# Patient Record
Sex: FEMALE | Race: WHITE | NOT HISPANIC OR LATINO | Employment: FULL TIME | URBAN - METROPOLITAN AREA
[De-identification: names, ages, dates, MRNs, and addresses within clinical notes are randomized per-mention and may not be internally consistent; named-entity substitution may affect disease eponyms.]

---

## 2017-12-20 ENCOUNTER — HOSPITAL ENCOUNTER (OUTPATIENT)
Dept: RADIOLOGY | Facility: HOSPITAL | Age: 42
Discharge: HOME | End: 2017-12-20
Attending: SURGERY
Payer: COMMERCIAL

## 2017-12-20 ENCOUNTER — OFFICE VISIT (OUTPATIENT)
Dept: SURGERY | Facility: CLINIC | Age: 42
End: 2017-12-20
Payer: COMMERCIAL

## 2017-12-20 VITALS
SYSTOLIC BLOOD PRESSURE: 144 MMHG | HEIGHT: 64 IN | WEIGHT: 201.94 LBS | TEMPERATURE: 98 F | HEART RATE: 80 BPM | BODY MASS INDEX: 34.48 KG/M2 | DIASTOLIC BLOOD PRESSURE: 89 MMHG

## 2017-12-20 DIAGNOSIS — R22.1 SUBCUTANEOUS MASS OF NECK: ICD-10-CM

## 2017-12-20 DIAGNOSIS — R22.1 SUBCUTANEOUS MASS OF NECK: Primary | ICD-10-CM

## 2017-12-20 PROCEDURE — 99999 PR PBB SHADOW E&M-EST. PATIENT-LVL III: CPT | Mod: PBBFAC,,, | Performed by: SURGERY

## 2017-12-20 PROCEDURE — 76536 US EXAM OF HEAD AND NECK: CPT | Mod: TC,PO

## 2017-12-20 PROCEDURE — 99204 OFFICE O/P NEW MOD 45 MIN: CPT | Mod: S$GLB,,, | Performed by: SURGERY

## 2017-12-20 RX ORDER — CELECOXIB 200 MG/1
200 CAPSULE ORAL DAILY
COMMUNITY
End: 2021-05-17

## 2017-12-20 RX ORDER — AMLODIPINE BESYLATE 10 MG/1
10 TABLET ORAL DAILY
COMMUNITY
End: 2020-07-06

## 2017-12-20 NOTE — PROGRESS NOTES
"History & Physical    SUBJECTIVE:     History of Present Illness:  Patient is a 42 y.o. female with HTN presents with posterior neck cyst. She first noticed it 1.5 years ago.  A few weeks ago, the mass started causing her discomfort with certain positions.  It has fluctuated in size since she first noticed it. Patient denies drainage, redness, fever, chills.  She has never been placed on antibiotics for this. Symptoms are aggravated by positioning. Symptoms improve with time. She reports occasional headaches and nausea associated with increases in the pain at that site (mostly if she abruptly turns her head).  She is on no anticoagulation.     Chief Complaint   Patient presents with    Consult       Review of patient's allergies indicates:  No Known Allergies    Current Outpatient Prescriptions   Medication Sig Dispense Refill    amLODIPine (NORVASC) 10 MG tablet Take 10 mg by mouth once daily.      celecoxib (CELEBREX) 200 MG capsule Take 200 mg by mouth 2 (two) times daily.       No current facility-administered medications for this visit.        No past medical history on file.     No past surgical history on file.     No family history on file.     Social History   Substance Use Topics    Smoking status: Never Smoker    Smokeless tobacco: Never Used    Alcohol use Not on file        Review of Systems:  Review of Systems   Constitutional: Negative for chills, fever and unexpected weight change.   HENT: Negative.    Respiratory: Negative.    Cardiovascular: Negative.    Gastrointestinal: Positive for nausea.   Genitourinary: Negative.    Musculoskeletal: Positive for neck pain.   Neurological: Positive for headaches.   Hematological: Does not bruise/bleed easily.       OBJECTIVE:     Vital Signs (Most Recent)  Temp: 98.4 °F (36.9 °C) (12/20/17 1349)  Pulse: 80 (12/20/17 1349)  BP: (!) 144/89 (12/20/17 1349)  5' 4" (1.626 m)  91.6 kg (201 lb 15.1 oz)     Physical Exam:  Physical Exam   Constitutional: She " appears well-developed and well-nourished. No distress.   HENT:   Head: Normocephalic and atraumatic.   Eyes: Conjunctivae and EOM are normal. No scleral icterus.   Neck: Normal range of motion.   Subcutaneous mass in the posterior midline neck near her hair line; mass is not affiliated with the skin; it is non-tender; no erythema or fluctuance; it is semi-soft; it feels deep and not easily mobile   Cardiovascular: Normal rate and regular rhythm.    Pulmonary/Chest: Effort normal. No respiratory distress.   Skin: She is not diaphoretic.   Nursing note and vitals reviewed.      Laboratory  n/a    Diagnostic Results:  n/a    ASSESSMENT/PLAN:     Sofie Muniz is a 42 y.o. female with HTN and posterior neck subcutaneous mass    PLAN:Plan     -Prior to any operative intervention, will obtain US to better characterize mass in the posterior midline neck  -RTC after to discuss results +/- remove the mass in clinic    KATHY Garner MD  PGY-4  Pager: [phone]

## 2017-12-22 ENCOUNTER — OFFICE VISIT (OUTPATIENT)
Dept: SURGERY | Facility: CLINIC | Age: 42
End: 2017-12-22
Payer: COMMERCIAL

## 2017-12-22 VITALS
DIASTOLIC BLOOD PRESSURE: 88 MMHG | BODY MASS INDEX: 34.03 KG/M2 | WEIGHT: 199.31 LBS | HEIGHT: 64 IN | SYSTOLIC BLOOD PRESSURE: 138 MMHG | TEMPERATURE: 98 F | HEART RATE: 79 BPM

## 2017-12-22 DIAGNOSIS — R22.1 NECK MASS: Primary | ICD-10-CM

## 2017-12-22 PROCEDURE — 20100 EXPL PENTRG WOUND NECK: CPT | Mod: ,,, | Performed by: SURGERY

## 2017-12-22 PROCEDURE — 99999 PR PBB SHADOW E&M-EST. PATIENT-LVL III: CPT | Mod: PBBFAC,,, | Performed by: SURGERY

## 2017-12-22 PROCEDURE — 99499 UNLISTED E&M SERVICE: CPT | Mod: S$GLB,,, | Performed by: SURGERY

## 2017-12-26 ENCOUNTER — TELEPHONE (OUTPATIENT)
Dept: SURGERY | Facility: CLINIC | Age: 42
End: 2017-12-26

## 2017-12-26 ENCOUNTER — HOSPITAL ENCOUNTER (OUTPATIENT)
Dept: RADIOLOGY | Facility: HOSPITAL | Age: 42
Discharge: HOME | End: 2017-12-26
Attending: SURGERY
Payer: COMMERCIAL

## 2017-12-26 DIAGNOSIS — Z01.818 PRE-OPERATIVE CLEARANCE: Primary | ICD-10-CM

## 2017-12-26 DIAGNOSIS — R22.1 NECK MASS: ICD-10-CM

## 2017-12-26 PROCEDURE — A9585 GADOBUTROL INJECTION: HCPCS | Mod: PO | Performed by: SURGERY

## 2017-12-26 PROCEDURE — 70543 MRI ORBT/FAC/NCK W/O &W/DYE: CPT | Mod: TC,PO

## 2017-12-26 PROCEDURE — 25500020 PHARM REV CODE 255: Mod: PO | Performed by: SURGERY

## 2017-12-26 RX ORDER — GADOBUTROL 604.72 MG/ML
10 INJECTION INTRAVENOUS
Status: COMPLETED | OUTPATIENT
Start: 2017-12-26 | End: 2017-12-26

## 2017-12-26 RX ADMIN — GADOBUTROL 10 ML: 604.72 INJECTION INTRAVENOUS at 03:12

## 2017-12-26 NOTE — TELEPHONE ENCOUNTER
----- Message from Lola Phelan sent at 12/26/2017  7:35 AM CST -----  Contact: [phone]/Sukumar with MRI at [facility]   Mr Patino called stating the pt has an MRI schedule this morning with contrast but they need orders for lab work before getting the MRI . Pt its at the waiting room . Please advise     12/26/17  07:50am    Contacted Dr.J. Zuñiga regarding issue. Verbal order given for CMP. Contacted Sukumar to inform him of order. Verbalized understanding.

## 2017-12-26 NOTE — TELEPHONE ENCOUNTER
----- Message from Sia Dorantes sent at 12/26/2017  3:31 PM CST -----  Contact: Self. [phone]  Patient is returning your call. Please advise.      12/26/2017             1656  Contacted pt regarding the above message. Pt inquired about the lidocaine that was administered to her during the procedure on 12/22/2017 and wether or not it would hinder her possibly getting a job, via a drug test. Informed pt that the medication was not a narcotic and she should be fine regarding her being tested for a job. Pt verbalized understanding.

## 2017-12-26 NOTE — TELEPHONE ENCOUNTER
----- Message from Ezequiel Narvaez sent at 12/26/2017  2:17 PM CST -----  Contact: [phone]/self  Pt requesting to speak with you concerning the neck injection she received.  Please call and advise     12/26/17           1502  Attempted to contact patient. No answer. Left voicemail to return call to the office.

## 2017-12-27 ENCOUNTER — TELEPHONE (OUTPATIENT)
Dept: SURGERY | Facility: CLINIC | Age: 42
End: 2017-12-27

## 2017-12-27 NOTE — TELEPHONE ENCOUNTER
----- Message from Kendra Kuhn sent at 12/27/2017  2:55 PM CST -----  Contact: [phone] -[phone]/SELF  Patient requesting to speak with you regarding scheduling her procedure. Please advise.      12/27/2017          1639  Contacted pt regarding the above message. Pt stated that she has not yet received a call regarding her procedure on 12/29/2017. Informed pt that she should receive a call no later than tomorrow about her surgery arrival and start time, however, if she does not get that information to contact us. Pt verbalized understanding.

## 2017-12-28 DIAGNOSIS — M79.89 SOFT TISSUE MASS: Primary | ICD-10-CM

## 2017-12-29 ENCOUNTER — HOSPITAL ENCOUNTER (OUTPATIENT)
Facility: HOSPITAL | Age: 42
Discharge: HOME | End: 2017-12-29
Attending: SURGERY | Admitting: SURGERY
Payer: COMMERCIAL

## 2017-12-29 ENCOUNTER — ANESTHESIA EVENT (OUTPATIENT)
Dept: SURGERY | Facility: HOSPITAL | Age: 42
End: 2017-12-29
Payer: COMMERCIAL

## 2017-12-29 ENCOUNTER — ANESTHESIA (OUTPATIENT)
Dept: SURGERY | Facility: HOSPITAL | Age: 42
End: 2017-12-29
Payer: COMMERCIAL

## 2017-12-29 ENCOUNTER — SURGERY (OUTPATIENT)
Age: 42
End: 2017-12-29

## 2017-12-29 VITALS — SYSTOLIC BLOOD PRESSURE: 136 MMHG | HEART RATE: 81 BPM | DIASTOLIC BLOOD PRESSURE: 59 MMHG | OXYGEN SATURATION: 100 %

## 2017-12-29 VITALS
TEMPERATURE: 98 F | SYSTOLIC BLOOD PRESSURE: 130 MMHG | DIASTOLIC BLOOD PRESSURE: 69 MMHG | OXYGEN SATURATION: 96 % | HEIGHT: 64 IN | HEART RATE: 81 BPM | WEIGHT: 199 LBS | BODY MASS INDEX: 33.97 KG/M2 | RESPIRATION RATE: 18 BRPM

## 2017-12-29 DIAGNOSIS — R22.1 NECK MASS: Primary | ICD-10-CM

## 2017-12-29 LAB
B-HCG UR QL: NEGATIVE
CTP QC/QA: YES

## 2017-12-29 PROCEDURE — 25000003 PHARM REV CODE 250: Performed by: NURSE ANESTHETIST, CERTIFIED REGISTERED

## 2017-12-29 PROCEDURE — 36000707: Performed by: SURGERY

## 2017-12-29 PROCEDURE — 63600175 PHARM REV CODE 636 W HCPCS: Performed by: SURGERY

## 2017-12-29 PROCEDURE — 63600175 PHARM REV CODE 636 W HCPCS: Performed by: NURSE ANESTHETIST, CERTIFIED REGISTERED

## 2017-12-29 PROCEDURE — 37000008 HC ANESTHESIA 1ST 15 MINUTES: Performed by: SURGERY

## 2017-12-29 PROCEDURE — 25000003 PHARM REV CODE 250: Performed by: SURGERY

## 2017-12-29 PROCEDURE — 71000015 HC POSTOP RECOV 1ST HR: Performed by: SURGERY

## 2017-12-29 PROCEDURE — 37000009 HC ANESTHESIA EA ADD 15 MINS: Performed by: SURGERY

## 2017-12-29 PROCEDURE — 12041 INTMD RPR N-HF/GENIT 2.5CM/<: CPT | Mod: 78,,, | Performed by: SURGERY

## 2017-12-29 PROCEDURE — 63600175 PHARM REV CODE 636 W HCPCS: Performed by: ANESTHESIOLOGY

## 2017-12-29 PROCEDURE — 71000039 HC RECOVERY, EACH ADD'L HOUR: Performed by: SURGERY

## 2017-12-29 PROCEDURE — 81025 URINE PREGNANCY TEST: CPT | Performed by: SURGERY

## 2017-12-29 PROCEDURE — 36000706: Performed by: SURGERY

## 2017-12-29 PROCEDURE — 71000033 HC RECOVERY, INTIAL HOUR: Performed by: SURGERY

## 2017-12-29 PROCEDURE — 71000016 HC POSTOP RECOV ADDL HR: Performed by: SURGERY

## 2017-12-29 RX ORDER — ONDANSETRON 2 MG/ML
INJECTION INTRAMUSCULAR; INTRAVENOUS
Status: DISCONTINUED | OUTPATIENT
Start: 2017-12-29 | End: 2017-12-29

## 2017-12-29 RX ORDER — ONDANSETRON 2 MG/ML
4 INJECTION INTRAMUSCULAR; INTRAVENOUS DAILY PRN
Status: DISCONTINUED | OUTPATIENT
Start: 2017-12-29 | End: 2017-12-29 | Stop reason: HOSPADM

## 2017-12-29 RX ORDER — MIDAZOLAM HYDROCHLORIDE 1 MG/ML
INJECTION, SOLUTION INTRAMUSCULAR; INTRAVENOUS
Status: DISCONTINUED | OUTPATIENT
Start: 2017-12-29 | End: 2017-12-29

## 2017-12-29 RX ORDER — ACETAMINOPHEN 10 MG/ML
1000 INJECTION, SOLUTION INTRAVENOUS ONCE
Status: COMPLETED | OUTPATIENT
Start: 2017-12-29 | End: 2017-12-29

## 2017-12-29 RX ORDER — HYDROCODONE BITARTRATE AND ACETAMINOPHEN 5; 325 MG/1; MG/1
1 TABLET ORAL EVERY 4 HOURS PRN
Status: DISCONTINUED | OUTPATIENT
Start: 2017-12-29 | End: 2017-12-29 | Stop reason: HOSPADM

## 2017-12-29 RX ORDER — HYDROMORPHONE HYDROCHLORIDE 2 MG/ML
0.5 INJECTION, SOLUTION INTRAMUSCULAR; INTRAVENOUS; SUBCUTANEOUS EVERY 5 MIN PRN
Status: DISCONTINUED | OUTPATIENT
Start: 2017-12-29 | End: 2017-12-29 | Stop reason: HOSPADM

## 2017-12-29 RX ORDER — FENTANYL CITRATE 50 UG/ML
INJECTION, SOLUTION INTRAMUSCULAR; INTRAVENOUS
Status: DISCONTINUED | OUTPATIENT
Start: 2017-12-29 | End: 2017-12-29

## 2017-12-29 RX ORDER — BUPIVACAINE HYDROCHLORIDE 2.5 MG/ML
INJECTION, SOLUTION EPIDURAL; INFILTRATION; INTRACAUDAL
Status: DISCONTINUED | OUTPATIENT
Start: 2017-12-29 | End: 2017-12-29 | Stop reason: HOSPADM

## 2017-12-29 RX ORDER — HYDROCODONE BITARTRATE AND ACETAMINOPHEN 5; 325 MG/1; MG/1
1 TABLET ORAL
Qty: 21 TABLET | Refills: 0 | Status: SHIPPED | OUTPATIENT
Start: 2017-12-29 | End: 2020-07-06

## 2017-12-29 RX ORDER — GENTAMICIN SULFATE 40 MG/ML
INJECTION, SOLUTION INTRAMUSCULAR; INTRAVENOUS
Status: DISCONTINUED | OUTPATIENT
Start: 2017-12-29 | End: 2017-12-29 | Stop reason: HOSPADM

## 2017-12-29 RX ORDER — KETOROLAC TROMETHAMINE 30 MG/ML
30 INJECTION, SOLUTION INTRAMUSCULAR; INTRAVENOUS ONCE
Status: COMPLETED | OUTPATIENT
Start: 2017-12-29 | End: 2017-12-29

## 2017-12-29 RX ORDER — CEFAZOLIN SODIUM 2 G/50ML
2 SOLUTION INTRAVENOUS
Status: COMPLETED | OUTPATIENT
Start: 2017-12-29 | End: 2017-12-29

## 2017-12-29 RX ORDER — PROPOFOL 10 MG/ML
INJECTION, EMULSION INTRAVENOUS CONTINUOUS PRN
Status: DISCONTINUED | OUTPATIENT
Start: 2017-12-29 | End: 2017-12-29

## 2017-12-29 RX ORDER — DEXAMETHASONE SODIUM PHOSPHATE 4 MG/ML
INJECTION, SOLUTION INTRA-ARTICULAR; INTRALESIONAL; INTRAMUSCULAR; INTRAVENOUS; SOFT TISSUE
Status: DISCONTINUED | OUTPATIENT
Start: 2017-12-29 | End: 2017-12-29

## 2017-12-29 RX ORDER — LIDOCAINE HYDROCHLORIDE 10 MG/ML
INJECTION, SOLUTION EPIDURAL; INFILTRATION; INTRACAUDAL; PERINEURAL
Status: DISCONTINUED | OUTPATIENT
Start: 2017-12-29 | End: 2017-12-29 | Stop reason: HOSPADM

## 2017-12-29 RX ORDER — PROMETHAZINE HYDROCHLORIDE 25 MG/ML
INJECTION, SOLUTION INTRAMUSCULAR; INTRAVENOUS
Status: DISCONTINUED
Start: 2017-12-29 | End: 2017-12-29 | Stop reason: HOSPADM

## 2017-12-29 RX ORDER — LIDOCAINE HCL/PF 100 MG/5ML
SYRINGE (ML) INTRAVENOUS
Status: DISCONTINUED | OUTPATIENT
Start: 2017-12-29 | End: 2017-12-29

## 2017-12-29 RX ORDER — SODIUM CHLORIDE, SODIUM LACTATE, POTASSIUM CHLORIDE, CALCIUM CHLORIDE 600; 310; 30; 20 MG/100ML; MG/100ML; MG/100ML; MG/100ML
INJECTION, SOLUTION INTRAVENOUS CONTINUOUS PRN
Status: DISCONTINUED | OUTPATIENT
Start: 2017-12-29 | End: 2017-12-29

## 2017-12-29 RX ORDER — HYDROCODONE BITARTRATE AND ACETAMINOPHEN 10; 325 MG/1; MG/1
1 TABLET ORAL EVERY 4 HOURS PRN
Status: DISCONTINUED | OUTPATIENT
Start: 2017-12-29 | End: 2017-12-29 | Stop reason: HOSPADM

## 2017-12-29 RX ORDER — PROMETHAZINE HYDROCHLORIDE 25 MG/ML
6.25 INJECTION, SOLUTION INTRAMUSCULAR; INTRAVENOUS ONCE
Status: COMPLETED | OUTPATIENT
Start: 2017-12-29 | End: 2017-12-29

## 2017-12-29 RX ORDER — SODIUM CHLORIDE 9 MG/ML
INJECTION, SOLUTION INTRAVENOUS CONTINUOUS
Status: DISCONTINUED | OUTPATIENT
Start: 2017-12-29 | End: 2017-12-29 | Stop reason: HOSPADM

## 2017-12-29 RX ORDER — HYDROMORPHONE HYDROCHLORIDE 2 MG/ML
INJECTION, SOLUTION INTRAMUSCULAR; INTRAVENOUS; SUBCUTANEOUS
Status: DISCONTINUED | OUTPATIENT
Start: 2017-12-29 | End: 2017-12-29

## 2017-12-29 RX ADMIN — BUPIVACAINE HYDROCHLORIDE 30 ML: 2.5 INJECTION, SOLUTION EPIDURAL; INFILTRATION; INTRACAUDAL; PERINEURAL at 08:12

## 2017-12-29 RX ADMIN — SODIUM CHLORIDE, SODIUM LACTATE, POTASSIUM CHLORIDE, AND CALCIUM CHLORIDE: .6; .31; .03; .02 INJECTION, SOLUTION INTRAVENOUS at 08:12

## 2017-12-29 RX ADMIN — FENTANYL CITRATE 25 MCG: 50 INJECTION, SOLUTION INTRAMUSCULAR; INTRAVENOUS at 08:12

## 2017-12-29 RX ADMIN — FENTANYL CITRATE 25 MCG: 50 INJECTION, SOLUTION INTRAMUSCULAR; INTRAVENOUS at 09:12

## 2017-12-29 RX ADMIN — LIDOCAINE HYDROCHLORIDE 20 ML: 10 INJECTION, SOLUTION EPIDURAL; INFILTRATION; INTRACAUDAL; PERINEURAL at 08:12

## 2017-12-29 RX ADMIN — ACETAMINOPHEN 1000 MG: 10 INJECTION, SOLUTION INTRAVENOUS at 10:12

## 2017-12-29 RX ADMIN — ONDANSETRON 4 MG: 2 INJECTION INTRAMUSCULAR; INTRAVENOUS at 11:12

## 2017-12-29 RX ADMIN — LORAZEPAM 1 MG: 2 INJECTION INTRAMUSCULAR; INTRAVENOUS at 10:12

## 2017-12-29 RX ADMIN — ONDANSETRON 4 MG: 2 INJECTION, SOLUTION INTRAMUSCULAR; INTRAVENOUS at 08:12

## 2017-12-29 RX ADMIN — CEFAZOLIN SODIUM 2 G: 2 SOLUTION INTRAVENOUS at 08:12

## 2017-12-29 RX ADMIN — PROPOFOL 35 MCG/KG/MIN: 10 INJECTION, EMULSION INTRAVENOUS at 08:12

## 2017-12-29 RX ADMIN — HYDROMORPHONE HYDROCHLORIDE 0.4 MG: 2 INJECTION INTRAMUSCULAR; INTRAVENOUS; SUBCUTANEOUS at 09:12

## 2017-12-29 RX ADMIN — HYDROMORPHONE HYDROCHLORIDE 0.2 MG: 2 INJECTION INTRAMUSCULAR; INTRAVENOUS; SUBCUTANEOUS at 09:12

## 2017-12-29 RX ADMIN — MIDAZOLAM 2 MG: 1 INJECTION INTRAMUSCULAR; INTRAVENOUS at 08:12

## 2017-12-29 RX ADMIN — LIDOCAINE HYDROCHLORIDE 100 MG: 20 INJECTION, SOLUTION INTRAVENOUS at 08:12

## 2017-12-29 RX ADMIN — DEXAMETHASONE SODIUM PHOSPHATE 4 MG: 4 INJECTION, SOLUTION INTRAMUSCULAR; INTRAVENOUS at 09:12

## 2017-12-29 RX ADMIN — KETOROLAC TROMETHAMINE 30 MG: 30 INJECTION, SOLUTION INTRAMUSCULAR at 10:12

## 2017-12-29 RX ADMIN — HYDROMORPHONE HYDROCHLORIDE 0.4 MG: 2 INJECTION INTRAMUSCULAR; INTRAVENOUS; SUBCUTANEOUS at 10:12

## 2017-12-29 RX ADMIN — PROMETHAZINE HYDROCHLORIDE 6.25 MG: 25 INJECTION INTRAMUSCULAR; INTRAVENOUS at 01:12

## 2017-12-29 RX ADMIN — GENTAMICIN SULFATE 80 MG: 40 INJECTION, SOLUTION INTRAMUSCULAR; INTRAVENOUS at 09:12

## 2017-12-29 RX ADMIN — MIDAZOLAM 3 MG: 1 INJECTION INTRAMUSCULAR; INTRAVENOUS at 08:12

## 2017-12-29 RX ADMIN — HYDROMORPHONE HYDROCHLORIDE 0.2 MG: 2 INJECTION INTRAMUSCULAR; INTRAVENOUS; SUBCUTANEOUS at 10:12

## 2017-12-29 RX ADMIN — HYDROMORPHONE HYDROCHLORIDE 0.5 MG: 2 INJECTION INTRAMUSCULAR; INTRAVENOUS; SUBCUTANEOUS at 10:12

## 2017-12-29 NOTE — DISCHARGE INSTRUCTIONS
May remove outer dressing in 48 hours. Underlying Steri Strips will remain in place for 7-10 days and fall off on their own. May shower in 48 hours with soap and water. Do not soak incision in standing water/tub/pool until skin incision is completely healed.    Discharge Instructions: After Your Surgery  Youve just had surgery. During surgery, you were given medicine called anesthesia to keep you relaxed and free of pain. After surgery, you may have some pain or nausea. This is common. Here are some tips for feeling better and getting well after surgery.     Stay on schedule with your medicine.   Going home  Your healthcare provider will show you how to take care of yourself when you go home. He or she will also answer your questions. Have an adult family member or friend drive you home. For the first 24 hours after your surgery:  · Do not drive or use heavy equipment.  · Do not make important decisions or sign legal papers.  · Do not drink alcohol.  · Have someone stay with you, if needed. He or she can watch for problems and help keep you safe.  Be sure to go to all follow-up visits with your healthcare provider. And rest after your surgery for as long as your healthcare provider tells you to.  Coping with pain  If you have pain after surgery, pain medicine will help you feel better. Take it as told, before pain becomes severe. Also, ask your healthcare provider or pharmacist about other ways to control pain. This might be with heat, ice, or relaxation. And follow any other instructions your surgeon or nurse gives you.  Tips for taking pain medicine  To get the best relief possible, remember these points:  · Pain medicines can upset your stomach. Taking them with a little food may help.  · Most pain relievers taken by mouth need at least 20 to 30 minutes to start to work.  · Taking medicine on a schedule can help you remember to take it. Try to time your medicine so that you can take it before starting an  activity. This might be before you get dressed, go for a walk, or sit down for dinner.  · Constipation is a common side effect of pain medicines. Call your healthcare provider before taking any medicines such as laxatives or stool softeners to help ease constipation. Also ask if you should skip any foods. Drinking lots of fluids and eating foods such as fruits and vegetables that are high in fiber can also help. Remember, do not take laxatives unless your surgeon has prescribed them.  · Drinking alcohol and taking pain medicine can cause dizziness and slow your breathing. It can even be deadly. Do not drink alcohol while taking pain medicine.  · Pain medicine can make you react more slowly to things. Do not drive or run machinery while taking pain medicine.  Your healthcare provider may tell you to take acetaminophen to help ease your pain. Ask him or her how much you are supposed to take each day. Acetaminophen or other pain relievers may interact with your prescription medicines or other over-the-counter (OTC) medicines. Some prescription medicines have acetaminophen and other ingredients. Using both prescription and OTC acetaminophen for pain can cause you to overdose. Read the labels on your OTC medicines with care. This will help you to clearly know the list of ingredients, how much to take, and any warnings. It may also help you not take too much acetaminophen. If you have questions or do not understand the information, ask your pharmacist or healthcare provider to explain it to you before you take the OTC medicine.  Managing nausea  Some people have an upset stomach after surgery. This is often because of anesthesia, pain, or pain medicine, or the stress of surgery. These tips will help you handle nausea and eat healthy foods as you get better. If you were on a special food plan before surgery, ask your healthcare provider if you should follow it while you get better. These tips may help:  · Do not push  yourself to eat. Your body will tell you when to eat and how much.  · Start off with clear liquids and soup. They are easier to digest.  · Next try semi-solid foods, such as mashed potatoes, applesauce, and gelatin, as you feel ready.  · Slowly move to solid foods. Dont eat fatty, rich, or spicy foods at first.  · Do not force yourself to have 3 large meals a day. Instead eat smaller amounts more often.  · Take pain medicines with a small amount of solid food, such as crackers or toast, to avoid nausea.     Call your surgeon if  · You still have pain an hour after taking medicine. The medicine may not be strong enough.  · You feel too sleepy, dizzy, or groggy. The medicine may be too strong.  · You have side effects like nausea, vomiting, or skin changes, such as rash, itching, or hives.       If you have obstructive sleep apnea  You were given anesthesia medicine during surgery to keep you comfortable and free of pain. After surgery, you may have more apnea spells because of this medicine and other medicines you were given. The spells may last longer than usual.   At home:  · Keep using the continuous positive airway pressure (CPAP) device when you sleep. Unless your healthcare provider tells you not to, use it when you sleep, day or night. CPAP is a common device used to treat obstructive sleep apnea.  · Talk with your provider before taking any pain medicine, muscle relaxants, or sedatives. Your provider will tell you about the possible dangers of taking these medicines.        Acetaminophen; Hydrocodone tablets or capsules  What is this medicine?  ACETAMINOPHEN; HYDROCODONE (a set a ENRRIQUE javier fen; chiki droe KOE done) is a pain reliever. It is used to treat moderate to severe pain.  How should I use this medicine?  Take this medicine by mouth with a glass of water. Follow the directions on the prescription label. You can take it with or without food. If it upsets your stomach, take it with food. Do not take your  medicine more often than directed.  A special MedGuide will be given to you by the pharmacist with each prescription and refill. Be sure to read this information carefully each time.  Talk to your pediatrician regarding the use of this medicine in children. Special care may be needed.  What side effects may I notice from receiving this medicine?  Side effects that you should report to your doctor or health care professional as soon as possible:  · allergic reactions like skin rash, itching or hives, swelling of the face, lips, or tongue  · breathing problems  · confusion  · redness, blistering, peeling or loosening of the skin, including inside the mouth  · signs and symptoms of low blood pressure like dizziness; feeling faint or lightheaded, falls; unusually weak or tired  · trouble passing urine or change in the amount of urine  · yellowing of the eyes or skin  Side effects that usually do not require medical attention (report to your doctor or health care professional if they continue or are bothersome):  · constipation  · dry mouth  · nausea, vomiting  · tiredness  What may interact with this medicine?  This medicine may interact with the following medications:  · alcohol  · antiviral medicines for HIV or AIDS  · atropine  · antihistamines for allergy, cough and cold  · certain antibiotics like erythromycin, clarithromycin  · certain medicines for anxiety or sleep  · certain medicines for bladder problems like oxybutynin, tolterodine  · certain medicines for depression like amitriptyline, fluoxetine, sertraline  · certain medicines for fungal infections like ketoconazole and itraconazole  · certain medicines for Parkinson's disease like benztropine, trihexyphenidyl  · certain medicines for seizures like carbamazepine, phenobarbital, phenytoin, primidone  · certain medicines for stomach problems like dicyclomine, hyoscyamine  · certain medicines for travel sickness like scopolamine  · general anesthetics like  halothane, isoflurane, methoxyflurane, propofol  · ipratropium  · local anesthetics like lidocaine, pramoxine, tetracaine  · MAOIs like Carbex, Eldepryl, Marplan, Nardil, and Parnate  · medicines that relax muscles for surgery  · other medicines with acetaminophen  · other narcotic medicines for pain or cough  · phenothiazines like chlorpromazine, mesoridazine, prochlorperazine, thioridazine  · rifampin  What if I miss a dose?  If you miss a dose, take it as soon as you can. If it is almost time for your next dose, take only that dose. Do not take double or extra doses.  Where should I keep my medicine?  Keep out of the reach of children. This medicine can be abused. Keep your medicine in a safe place to protect it from theft. Do not share this medicine with anyone. Selling or giving away this medicine is dangerous and against the law.  This medicine may cause accidental overdose and death if it taken by other adults, children, or pets. Mix any unused medicine with a substance like cat litter or coffee grounds. Then throw the medicine away in a sealed container like a sealed bag or a coffee can with a lid. Do not use the medicine after the expiration date.  Store at room temperature between 15 and 30 degrees C (59 and 86 degrees F).  What should I tell my health care provider before I take this medicine?  They need to know if you have any of these conditions:  · brain tumor  · Crohn's disease, inflammatory bowel disease, or ulcerative colitis  · drug abuse or addiction  · head injury  · heart or circulation problems  · if you often drink alcohol  · kidney disease or problems going to the bathroom  · liver disease  · lung disease, asthma, or breathing problems  · an unusual or allergic reaction to acetaminophen, hydrocodone, other opioid analgesics, other medicines, foods, dyes, or preservatives  · pregnant or trying to get pregnant  · breast-feeding  What should I watch for while using this medicine?  Tell your  doctor or health care professional if your pain does not go away, if it gets worse, or if you have new or a different type of pain. You may develop tolerance to the medicine. Tolerance means that you will need a higher dose of the medicine for pain relief. Tolerance is normal and is expected if you take the medicine for a long time.  Do not suddenly stop taking your medicine because you may develop a severe reaction. Your body becomes used to the medicine. This does NOT mean you are addicted. Addiction is a behavior related to getting and using a drug for a non-medical reason. If you have pain, you have a medical reason to take pain medicine. Your doctor will tell you how much medicine to take. If your doctor wants you to stop the medicine, the dose will be slowly lowered over time to avoid any side effects.  There are different types of narcotic medicines (opiates). If you take more than one type at the same time or if you are taking another medicine that also causes drowsiness, you may have more side effects. Give your health care provider a list of all medicines you use. Your doctor will tell you how much medicine to take. Do not take more medicine than directed. Call emergency for help if you have problems breathing or unusual sleepiness.  Do not take other medicines that contain acetaminophen with this medicine. Always read labels carefully. If you have questions, ask your doctor or pharmacist.  If you take too much acetaminophen get medical help right away. Too much acetaminophen can be very dangerous and cause liver damage. Even if you do not have symptoms, it is important to get help right away.  You may get drowsy or dizzy. Do not drive, use machinery, or do anything that needs mental alertness until you know how this medicine affects you. Do not stand or sit up quickly, especially if you are an older patient. This reduces the risk of dizzy or fainting spells. Alcohol may interfere with the effect of this  medicine. Avoid alcoholic drinks.  The medicine will cause constipation. Try to have a bowel movement at least every 2 to 3 days. If you do not have a bowel movement for 3 days, call your doctor or health care professional.  Your mouth may get dry. Chewing sugarless gum or sucking hard candy, and drinking plenty of water may help. Contact your doctor if the problem does not go away or is severe.  NOTE:This sheet is a summary. It may not cover all possible information. If you have questions about this medicine, talk to your doctor, pharmacist, or health care provider. Copyright© 2017 [employer]

## 2017-12-29 NOTE — ANESTHESIA POSTPROCEDURE EVALUATION
"Anesthesia Post Evaluation    Patient: Sofie Muniz    Procedure(s) Performed: Procedure(s) (LRB):  EXPLORATION-WOUND (N/A)    Final Anesthesia Type: MAC  Patient location during evaluation: PACU  Patient participation: Yes- Able to Participate  Level of consciousness: awake and alert  Post-procedure vital signs: reviewed and stable  Pain management: adequate  Airway patency: patent  PONV status at discharge: No PONV  Anesthetic complications: no      Cardiovascular status: blood pressure returned to baseline  Respiratory status: unassisted  Hydration status: euvolemic  Follow-up not needed.        Visit Vitals  /69   Pulse (!) 52   Temp 36.8 °C (98.2 °F) (Skin)   Resp 17   Ht 5' 4" (1.626 m)   Wt 90.3 kg (199 lb)   SpO2 95%   Breastfeeding? No   BMI 34.16 kg/m²       Pain/Pura Score: Pain Assessment Performed: Yes (12/29/2017 11:16 AM)  Presence of Pain: complains of pain/discomfort (12/29/2017 11:16 AM)  Pain Rating Prior to Med Admin: 9 (12/29/2017 10:40 AM)  Pura Score: 10 (12/29/2017 11:16 AM)      "

## 2017-12-29 NOTE — INTERVAL H&P NOTE
The patient has been examined and the H&P has been reviewed:    I concur with the findings and no changes have occurred since H&P was written.    Anesthesia/Surgery risks, benefits and alternative options discussed and understood by patient/family.          There are no hospital problems to display for this patient.      Excision attempted in office however deep and patient discomfort  Decision to abort and plan for mac anesthesia assistance   Risks of surgery discussed including bleeding, infection, pain, scarring, wound complications, injury to local structures, and need for further surgery.  The patient demonstrated understanding of risks and consent reviewed.     MRI reviewed with Dr. Washington

## 2017-12-29 NOTE — H&P (VIEW-ONLY)
"History & Physical    SUBJECTIVE:     History of Present Illness:  Patient is a 42 y.o. female with HTN presents with posterior neck cyst. She first noticed it 1.5 years ago.  A few weeks ago, the mass started causing her discomfort with certain positions.  It has fluctuated in size since she first noticed it. Patient denies drainage, redness, fever, chills.  She has never been placed on antibiotics for this. Symptoms are aggravated by positioning. Symptoms improve with time. She reports occasional headaches and nausea associated with increases in the pain at that site (mostly if she abruptly turns her head).  She is on no anticoagulation.     Chief Complaint   Patient presents with    Consult       Review of patient's allergies indicates:  No Known Allergies    Current Outpatient Prescriptions   Medication Sig Dispense Refill    amLODIPine (NORVASC) 10 MG tablet Take 10 mg by mouth once daily.      celecoxib (CELEBREX) 200 MG capsule Take 200 mg by mouth 2 (two) times daily.       No current facility-administered medications for this visit.        No past medical history on file.     No past surgical history on file.     No family history on file.     Social History   Substance Use Topics    Smoking status: Never Smoker    Smokeless tobacco: Never Used    Alcohol use Not on file        Review of Systems:  Review of Systems   Constitutional: Negative for chills, fever and unexpected weight change.   HENT: Negative.    Respiratory: Negative.    Cardiovascular: Negative.    Gastrointestinal: Positive for nausea.   Genitourinary: Negative.    Musculoskeletal: Positive for neck pain.   Neurological: Positive for headaches.   Hematological: Does not bruise/bleed easily.       OBJECTIVE:     Vital Signs (Most Recent)  Temp: 98.4 °F (36.9 °C) (12/20/17 1349)  Pulse: 80 (12/20/17 1349)  BP: (!) 144/89 (12/20/17 1349)  5' 4" (1.626 m)  91.6 kg (201 lb 15.1 oz)     Physical Exam:  Physical Exam   Constitutional: She " appears well-developed and well-nourished. No distress.   HENT:   Head: Normocephalic and atraumatic.   Eyes: Conjunctivae and EOM are normal. No scleral icterus.   Neck: Normal range of motion.   Subcutaneous mass in the posterior midline neck near her hair line; mass is not affiliated with the skin; it is non-tender; no erythema or fluctuance; it is semi-soft; it feels deep and not easily mobile   Cardiovascular: Normal rate and regular rhythm.    Pulmonary/Chest: Effort normal. No respiratory distress.   Skin: She is not diaphoretic.   Nursing note and vitals reviewed.      Laboratory  n/a    Diagnostic Results:  n/a    ASSESSMENT/PLAN:     Sofie Muniz is a 42 y.o. female with HTN and posterior neck subcutaneous mass    PLAN:Plan     -Prior to any operative intervention, will obtain US to better characterize mass in the posterior midline neck  -RTC after to discuss results +/- remove the mass in clinic    KATHY Garner MD  PGY-4  Pager: [phone]

## 2017-12-29 NOTE — PLAN OF CARE
VSS. Received orders to give Toradol, Tylenol, and Ativan Iv. Will put in orders with corresponding Dr. For each medication and give medication as order by doctor. Other that pain level, there are no other changes noted. Ice also applied to surgical site. Will continue to monitor patient and notify family again about patient status.

## 2017-12-29 NOTE — PLAN OF CARE
VSS. Surgical dressing CDI. Patient emotional and complaining of severe pain of 10/10 @ operative site. Patient AAOx4. Will call Dr. Hamlin for orders regarding her pain level. Will also call anesthesia in regards to pain also. Will continue to monitor patient.

## 2017-12-29 NOTE — BRIEF OP NOTE
[facility]  Brief Operative Note     SUMMARY     Surgery Date: 12/29/2017     Surgeon(s) and Role:     * Tatiana Hamlin MD - Primary    Assisting Surgeon: Barrera Shirley MD - RES    Pre-op Diagnosis:  Soft tissue mass [M79.9]    Post-op Diagnosis:  Post-Op Diagnosis Codes:     * Soft tissue mass [M79.9]    Procedure(s) (LRB):  EXPLORATION-WOUND (N/A)    Anesthesia: Monitor Anesthesia Care    Description of the findings of the procedure: Exploration of posterior neck wound.    Findings/Key Components: Posterior neck wound explored; no evidence of any masses or other pathologic findings; no biopsies taken.     Estimated Blood Loss: * No values recorded between 12/29/2017  8:48 AM and 12/29/2017  9:51 AM *         Specimens:   Specimen (12h ago through future)    None          Discharge Note    SUMMARY     Admit Date: 12/29/2017    Discharge Date and Time:  12/29/2017 9:57 AM    Hospital Course (synopsis of major diagnoses, care, treatment, and services provided during the course of the hospital stay): The patient presented for exploration of a posterior neck wound for possible mass excision. The wound was explored without any significant findings. The patient tolerated the procedure well and was taken to the recovery area postoperatively. She was deemed safe for discharge home the same day.     Final Diagnosis: Post-Op Diagnosis Codes:     * Soft tissue mass [M79.9]    Disposition: Home or Self Care    Follow Up/Patient Instructions:     Medications:  Reconciled Home Medications:   Current Discharge Medication List      START taking these medications    Details   hydrocodone-acetaminophen 5-325mg (NORCO) 5-325 mg per tablet Take 1 tablet by mouth every 4 to 6 hours as needed for Pain.  Qty: 21 tablet, Refills: 0         CONTINUE these medications which have NOT CHANGED    Details   amLODIPine (NORVASC) 10 MG tablet Take 10 mg by mouth once daily.      celecoxib (CELEBREX) 200 MG capsule Take 200 mg  by mouth 2 (two) times daily.             Discharge Procedure Orders  Activity as tolerated     Notify your health care provider if you experience any of the following:  temperature >100.4     Notify your health care provider if you experience any of the following:  persistent nausea and vomiting or diarrhea     Notify your health care provider if you experience any of the following:  severe uncontrolled pain     Notify your health care provider if you experience any of the following:  redness, tenderness, or signs of infection (pain, swelling, redness, odor or green/yellow discharge around incision site)     Notify your health care provider if you experience any of the following:  difficulty breathing or increased cough     Notify your health care provider if you experience any of the following:  severe persistent headache     Notify your health care provider if you experience any of the following:  worsening rash     Notify your health care provider if you experience any of the following:  persistent dizziness, light-headedness, or visual disturbances     Notify your health care provider if you experience any of the following:  increased confusion or weakness     Remove dressing in 48 hours   Scheduling Instructions: May remove outer dressing in 48 hours. Underlying Steri Strips will remain in place for 7-10 days and fall off on their own. May shower in 48 hours with soap and water. Do not soak incision in standing water/tub/pool until skin incision is completely healed.       Follow-up Information     Tatiana Hamlin MD In 2 weeks.    Specialties:  Surgery, General Surgery  Why:  For postoperative follow up.  Contact information:  [address]  [address]  [place] [id number] [phone]

## 2017-12-29 NOTE — PROGRESS NOTES
Ultrasound reviewed 2 cm fatty tissue mass superficial we'll proceed with excision in office  Risks of surgery discussed including bleeding, infection, pain, scarring, wound complications, injury to local structures, and need for further surgery.  The patient demonstrated understanding of risks and consent signed.       Date of operation 12/22/17    Surgeon: Tatiana Hamlin    Anesthesia:  local     Prep chlorhexidine    Specimen: none    Indication:42-year-old female with soft tissue mass ultrasound shows 2 cm lipomatous mass here for excision    Procedure the patient was prepped and draped in the standard sterile fashion.  A timeout procedure was performed in accordance with hospital protocol.  A 2 cm incision was made in the posterior neck with a 15 blade scalpel.  The tissues were dissected.  A mass was palpable but felt deep and approximately 8 mm.  This corresponded to her area of pain.  The muscle was bluntly  medially the and lateral and fatty tissue was encountered however did not have discrete borders and did not correspond to her area of tenderness.  Further dissection was carried out bluntly and given the deep nature and her discomfort, wediscussed   closing her incision and proceeding to the operating room after an MRI was obtained.  She demonstrated understanding of this and agreed.  The wound was closed in 2 layers after hemostasis was achieved and confirmed.  3-0 nylon suture was used to reapproximate the superficial epidermis.  Dry sterile dressings were applied.  There were no complications and the patient tolerated the procedure well.    Complications: None    Condition: Stable

## 2017-12-29 NOTE — OP NOTE
DATE OF PROCEDURE:  12/29/2017    PREOPERATIVE DIAGNOSIS:  Posterior neck mass.    POSTOPERATIVE DIAGNOSIS:  Posterior neck mass.    PROCEDURE:  Neck, exploration of wound.    SURGEON:  Tatiana Hamlin M.D.    ASSISTANT:  Barrera Shirley PGY II.    ANESTHESIA:  Local MAC.    SPECIMEN:  None.    PREP:  Chlorhexidine.    ESTIMATED BLOOD LOSS:  Minimal.    INDICATIONS:  The patient is a 42-year-old female who had a posterior neck mass,   which was described as 2 cm and lipomatous in nature by ultrasound.  An attempt   at excision was done in the office setting on 12/22/2017 without any   identifiable mass of 2 cm size.  Given the depth of our exploration, MRI was   recommended and obtained in the interval.  It did show postoperative changes,   but no identifiable soft tissue mass.  However, this was limited given recent   surgery.  Wound exploration was again discussed and the patient elected to   proceed.    DESCRIPTION OF PROCEDURE:  The patient was identified in the Preoperative Unit   and taken back to the Operating Room and IV sedation was administered.  IV   antibiotics were administered prior to the administration of anesthesia.  The   patient was then prepped prone with appropriate padding in place.  Her skin was   prepped with chlorhexidine and draped in a standard sterile fashion.  A timeout   procedure was performed in accordance with the hospital protocol.  Her prior   incision was reopened and a small fluid collection was evacuated.  This was   simple in nature and not infected.  A small 5 mm mass was palpated at the base   of the wound, which corresponded to the area of her concern in the office   setting.  The overlying muscle was incised and this allowed exposure.  We did   identify a nerve here that tracked inferiorly, but was not associated with any   cyst or lipomatous like structure.  There were no other palpable findings other   than this nerve and it did not appear to be inflamed or with neuroma.  The  wound   was irrigated with gentamicin irrigation.  Decision was made to close given the   lack of findings.  It was closed in 2 layers using 3-0 Vicryl suture   interrupted and 4-0 Monocryl at the skin level.  Dry sterile dressings were   applied.  The patient was awakened from anesthesia without complication and   returned to the Postoperative Recovery Unit in stable condition.  At the end of   case, sponge, instrument and needle counts were correct on 2 occasions.  I was   present and scrubbed throughout the entirety of the case.    COMPLICATIONS:  None.    CONDITION:  Stable.      RO/IN  dd: 12/29/2017 10:54:51 (CST)  td: 12/29/2017 15:05:33 (CST)  Doc ID   #[id number]  Job ID #[id number]    CC:

## 2017-12-29 NOTE — ANESTHESIA PREPROCEDURE EVALUATION
12/29/2017  Sofie Muniz is a 42 y.o., female for lipoma excision under MAC    Anesthesia Evaluation    I have reviewed the Patient Summary Reports.    I have reviewed the Nursing Notes.      Review of Systems  Anesthesia Hx:  No problems with previous Anesthesia Denies Hx of Anesthetic complications    Social:  Non-Smoker    Hematology/Oncology:  Hematology Normal        Cardiovascular:   Exercise tolerance: good Hypertension    Pulmonary:  Pulmonary Normal    Renal/:  Renal/ Normal     Hepatic/GI:  Hepatic/GI Normal    Musculoskeletal:  Musculoskeletal Normal    Neurological:  Neurology Normal    Endocrine:  Endocrine Normal    Psych:  Psychiatric Normal           Physical Exam  General:  Obesity    Airway/Jaw/Neck:  Airway Findings: Mouth Opening: Normal Tongue: Normal  General Airway Assessment: Adult  Mallampati: II  TM Distance: Normal, at least 6 cm      Dental:  Dental Findings: In tact   Chest/Lungs:  Chest/Lungs Findings: Clear to auscultation, Normal Respiratory Rate     Heart/Vascular:  Heart Findings: Rate: Normal  Rhythm: Regular Rhythm  Sounds: Normal        Mental Status:  Mental Status Findings:  Alert and Oriented, Cooperative       Lab Results   Component Value Date    ALT 57 (H) 12/26/2017    AST 40 12/26/2017     12/26/2017    K 4.0 12/26/2017     12/26/2017    CREATININE 0.70 12/26/2017    BUN 12 12/26/2017    CO2 26 12/26/2017     Wt Readings from Last 3 Encounters:   12/22/17 90.4 kg (199 lb 4.7 oz)   12/20/17 91.6 kg (201 lb 15.1 oz)     Temp Readings from Last 3 Encounters:   12/22/17 36.7 °C (98 °F) (Oral)   12/20/17 36.9 °C (98.4 °F) (Oral)     BP Readings from Last 3 Encounters:   12/22/17 138/88   12/20/17 (!) 144/89     Pulse Readings from Last 3 Encounters:   12/22/17 79   12/20/17 80         Anesthesia Plan  Type of Anesthesia, risks & benefits  discussed:  Anesthesia Type:  general, MAC  Patient's Preference:   Intra-op Monitoring Plan: standard ASA monitors  Intra-op Monitoring Plan Comments:   Post Op Pain Control Plan: multimodal analgesia  Post Op Pain Control Plan Comments:   Induction:   IV  Beta Blocker:  Patient is not currently on a Beta-Blocker (No further documentation required).       Informed Consent: Patient understands risks and agrees with Anesthesia plan.  Questions answered. Anesthesia consent signed with patient.  ASA Score: 2     Day of Surgery Review of History & Physical:    H&P update referred to the provider.         Ready For Surgery From Anesthesia Perspective.

## 2018-01-20 ENCOUNTER — HOSPITAL ENCOUNTER (EMERGENCY)
Facility: HOSPITAL | Age: 43
Discharge: HOME | End: 2018-01-20
Attending: FAMILY MEDICINE
Payer: COMMERCIAL

## 2018-01-20 VITALS
BODY MASS INDEX: 33.15 KG/M2 | TEMPERATURE: 98 F | WEIGHT: 199 LBS | RESPIRATION RATE: 18 BRPM | HEIGHT: 65 IN | OXYGEN SATURATION: 98 % | SYSTOLIC BLOOD PRESSURE: 119 MMHG | DIASTOLIC BLOOD PRESSURE: 65 MMHG | HEART RATE: 79 BPM

## 2018-01-20 DIAGNOSIS — N30.01 ACUTE CYSTITIS WITH HEMATURIA: Primary | ICD-10-CM

## 2018-01-20 LAB
ALBUMIN SERPL BCP-MCNC: 4.3 G/DL
ALP SERPL-CCNC: 86 U/L
ALT SERPL W/O P-5'-P-CCNC: 42 U/L
ANION GAP SERPL CALC-SCNC: 12 MMOL/L
AST SERPL-CCNC: 32 U/L
BACTERIA #/AREA URNS AUTO: ABNORMAL /HPF
BASOPHILS # BLD AUTO: 0.01 K/UL
BASOPHILS NFR BLD: 0.1 %
BILIRUB SERPL-MCNC: 0.7 MG/DL
BILIRUB UR QL STRIP: ABNORMAL
BUN SERPL-MCNC: 11 MG/DL
CALCIUM SERPL-MCNC: 9.1 MG/DL
CHLORIDE SERPL-SCNC: 103 MMOL/L
CLARITY UR REFRACT.AUTO: ABNORMAL
CO2 SERPL-SCNC: 26 MMOL/L
COLOR UR AUTO: ABNORMAL
CREAT SERPL-MCNC: 0.66 MG/DL
DIFFERENTIAL METHOD: ABNORMAL
EOSINOPHIL # BLD AUTO: 0.1 K/UL
EOSINOPHIL NFR BLD: 0.4 %
ERYTHROCYTE [DISTWIDTH] IN BLOOD BY AUTOMATED COUNT: 11.8 %
EST. GFR  (AFRICAN AMERICAN): >60 ML/MIN/1.73 M^2
EST. GFR  (NON AFRICAN AMERICAN): >60 ML/MIN/1.73 M^2
GLUCOSE SERPL-MCNC: 110 MG/DL
GLUCOSE UR QL STRIP: NEGATIVE
GRAN CASTS UR QL COMP ASSIST: 1 /LPF
HCT VFR BLD AUTO: 40.2 %
HGB BLD-MCNC: 13.4 G/DL
HGB UR QL STRIP: ABNORMAL
HYALINE CASTS UR QL AUTO: 0 /LPF
KETONES UR QL STRIP: NEGATIVE
LEUKOCYTE ESTERASE UR QL STRIP: ABNORMAL
LYMPHOCYTES # BLD AUTO: 0.6 K/UL
LYMPHOCYTES NFR BLD: 4.7 %
MCH RBC QN AUTO: 29.8 PG
MCHC RBC AUTO-ENTMCNC: 33.3 G/DL
MCV RBC AUTO: 90 FL
MICROSCOPIC COMMENT: ABNORMAL
MONOCYTES # BLD AUTO: 0.8 K/UL
MONOCYTES NFR BLD: 6.2 %
NEUTROPHILS # BLD AUTO: 11.4 K/UL
NEUTROPHILS NFR BLD: 88.3 %
NITRITE UR QL STRIP: POSITIVE
PH UR STRIP: 5 [PH] (ref 5–8)
PLATELET # BLD AUTO: 264 K/UL
PMV BLD AUTO: 10.4 FL
POTASSIUM SERPL-SCNC: 4 MMOL/L
PROT SERPL-MCNC: 7.9 G/DL
PROT UR QL STRIP: ABNORMAL
RBC # BLD AUTO: 4.49 M/UL
RBC #/AREA URNS AUTO: >100 /HPF (ref 0–4)
SODIUM SERPL-SCNC: 141 MMOL/L
SP GR UR STRIP: 1.02 (ref 1–1.03)
URN SPEC COLLECT METH UR: ABNORMAL
UROBILINOGEN UR STRIP-ACNC: ABNORMAL EU/DL
WBC # BLD AUTO: 12.92 K/UL
WBC #/AREA URNS AUTO: >100 /HPF (ref 0–5)

## 2018-01-20 PROCEDURE — 87186 SC STD MICRODIL/AGAR DIL: CPT

## 2018-01-20 PROCEDURE — 87040 BLOOD CULTURE FOR BACTERIA: CPT | Mod: 59

## 2018-01-20 PROCEDURE — 87086 URINE CULTURE/COLONY COUNT: CPT

## 2018-01-20 PROCEDURE — 99284 EMERGENCY DEPT VISIT MOD MDM: CPT | Mod: 25

## 2018-01-20 PROCEDURE — 87077 CULTURE AEROBIC IDENTIFY: CPT

## 2018-01-20 PROCEDURE — 80053 COMPREHEN METABOLIC PANEL: CPT

## 2018-01-20 PROCEDURE — 96361 HYDRATE IV INFUSION ADD-ON: CPT

## 2018-01-20 PROCEDURE — 85025 COMPLETE CBC W/AUTO DIFF WBC: CPT

## 2018-01-20 PROCEDURE — 96374 THER/PROPH/DIAG INJ IV PUSH: CPT

## 2018-01-20 PROCEDURE — 87088 URINE BACTERIA CULTURE: CPT

## 2018-01-20 PROCEDURE — 63600175 PHARM REV CODE 636 W HCPCS: Performed by: FAMILY MEDICINE

## 2018-01-20 PROCEDURE — 96372 THER/PROPH/DIAG INJ SC/IM: CPT

## 2018-01-20 PROCEDURE — 81000 URINALYSIS NONAUTO W/SCOPE: CPT

## 2018-01-20 PROCEDURE — 25000003 PHARM REV CODE 250: Performed by: FAMILY MEDICINE

## 2018-01-20 RX ORDER — CEFTRIAXONE 1 G/1
1 INJECTION, POWDER, FOR SOLUTION INTRAMUSCULAR; INTRAVENOUS
Status: COMPLETED | OUTPATIENT
Start: 2018-01-20 | End: 2018-01-20

## 2018-01-20 RX ORDER — KETOROLAC TROMETHAMINE 30 MG/ML
30 INJECTION, SOLUTION INTRAMUSCULAR; INTRAVENOUS
Status: COMPLETED | OUTPATIENT
Start: 2018-01-20 | End: 2018-01-20

## 2018-01-20 RX ORDER — PHENAZOPYRIDINE HYDROCHLORIDE 200 MG/1
200 TABLET, FILM COATED ORAL 3 TIMES DAILY
Qty: 6 TABLET | Refills: 0 | Status: SHIPPED | OUTPATIENT
Start: 2018-01-20 | End: 2018-01-30

## 2018-01-20 RX ORDER — SULFAMETHOXAZOLE AND TRIMETHOPRIM 800; 160 MG/1; MG/1
1 TABLET ORAL 2 TIMES DAILY
Qty: 20 TABLET | Refills: 0 | Status: SHIPPED | OUTPATIENT
Start: 2018-01-20 | End: 2018-01-30

## 2018-01-20 RX ORDER — SULFAMETHOXAZOLE AND TRIMETHOPRIM 800; 160 MG/1; MG/1
2 TABLET ORAL
Status: COMPLETED | OUTPATIENT
Start: 2018-01-20 | End: 2018-01-20

## 2018-01-20 RX ADMIN — KETOROLAC TROMETHAMINE 30 MG: 30 INJECTION, SOLUTION INTRAMUSCULAR at 10:01

## 2018-01-20 RX ADMIN — SODIUM CHLORIDE 1000 ML: 0.9 INJECTION, SOLUTION INTRAVENOUS at 11:01

## 2018-01-20 RX ADMIN — CEFTRIAXONE SODIUM 1 G: 1 INJECTION, POWDER, FOR SOLUTION INTRAMUSCULAR; INTRAVENOUS at 11:01

## 2018-01-20 RX ADMIN — SULFAMETHOXAZOLE AND TRIMETHOPRIM 2 TABLET: 800; 160 TABLET ORAL at 11:01

## 2018-01-20 NOTE — ED PROVIDER NOTES
Encounter Date: 2018       History     Chief Complaint   Patient presents with    Urinary Tract Infection     pt has been taking azo for treatment of a uti, and is now having low back pain. Was snet from urgent care to r/o kidney infection      Patient complains of suprapubic pain, dysuria and right flank pain since last night.  Patient is very uncomfortable to sit and is standing in the exam room.  She denies any fever, chills, rigors.  She denies any vaginal bleeding.  No constipation or diarrhea.  No nausea or vomiting.  No pain in upper abdominal area or chest.  Patient had took Azo which probably improved dysuria symptoms but she has increased frequency.      The history is provided by the patient.     Review of patient's allergies indicates:  No Known Allergies  Past Medical History:   Diagnosis Date    Hypertension      Past Surgical History:   Procedure Laterality Date    back injection      steroid injections x2     SECTION      CHOLECYSTECTOMY      tubal ligation       No family history on file.  Social History   Substance Use Topics    Smoking status: Never Smoker    Smokeless tobacco: Never Used    Alcohol use No     Review of Systems   Constitutional: Negative for activity change, appetite change, chills and fever.   HENT: Negative for congestion, ear discharge, ear pain, rhinorrhea, sinus pain, sinus pressure, sore throat and voice change.    Eyes: Negative for photophobia, pain, discharge, redness, itching and visual disturbance.   Respiratory: Negative for cough, choking, chest tightness, shortness of breath and wheezing.    Cardiovascular: Negative for chest pain and palpitations.   Gastrointestinal: Positive for abdominal pain. Negative for abdominal distention, diarrhea, nausea and vomiting.   Genitourinary: Negative for dysuria, flank pain and frequency.   Musculoskeletal: Negative for back pain, gait problem, myalgias, neck pain and neck stiffness.   Skin: Negative for  rash.   Neurological: Negative for dizziness, speech difficulty, light-headedness, numbness and headaches.   Psychiatric/Behavioral: Negative for behavioral problems, confusion, decreased concentration, hallucinations and sleep disturbance. The patient is not nervous/anxious.    All other systems reviewed and are negative.      Physical Exam     Initial Vitals [01/20/18 0949]   BP Pulse Resp Temp SpO2   (!) 190/122 (!) 113 18 97.9 °F (36.6 °C) 98 %      MAP       144.67         Physical Exam    Nursing note and vitals reviewed.  Constitutional: She appears well-developed and well-nourished.   HENT:   Right Ear: External ear normal.   Left Ear: External ear normal.   Nose: Nose normal.   Mouth/Throat: Oropharynx is clear and moist.   Eyes: Conjunctivae and EOM are normal. Pupils are equal, round, and reactive to light.   Neck: Normal range of motion. Neck supple.   Cardiovascular: Normal rate, regular rhythm, normal heart sounds and intact distal pulses.   Pulmonary/Chest: Breath sounds normal. No respiratory distress. She has no wheezes. She has no rhonchi. She has no rales. She exhibits no tenderness.   Abdominal: Soft. Bowel sounds are normal. She exhibits no distension. There is tenderness in the suprapubic area. There is guarding and CVA tenderness. There is no rigidity, no rebound and negative Almaraz's sign.       Musculoskeletal: Normal range of motion.   Neurological: She is alert and oriented to person, place, and time. She has normal strength and normal reflexes. She displays normal reflexes. No cranial nerve deficit or sensory deficit.   Skin: Skin is warm. Capillary refill takes less than 2 seconds. No rash noted. No erythema.         ED Course   Procedures  Labs Reviewed   URINALYSIS - Abnormal; Notable for the following:        Result Value    Color, UA Brown (*)     Appearance, UA Cloudy (*)     Protein, UA 2+ (*)     Bilirubin (UA) 2+ (*)     Occult Blood UA 3+ (*)     Nitrite, UA Positive (*)      Urobilinogen, UA 4.0-6.0 (*)     Leukocytes, UA 3+ (*)     All other components within normal limits   URINALYSIS MICROSCOPIC - Abnormal; Notable for the following:     RBC, UA >100 (*)     WBC, UA >100 (*)     Bacteria, UA Moderate (*)     Granular Casts, UA 1 (*)     All other components within normal limits   CBC W/ AUTO DIFFERENTIAL - Abnormal; Notable for the following:     WBC 12.92 (*)     Gran # 11.4 (*)     Lymph # 0.6 (*)     Gran% 88.3 (*)     Lymph% 4.7 (*)     All other components within normal limits   CULTURE, URINE   CULTURE, BLOOD   CULTURE, BLOOD   CULTURE, URINE   COMPREHENSIVE METABOLIC PANEL             Medical Decision Making:   Initial Assessment:   Patient presents with lower abdominal pain with increased frequency and dysuria not getting better with Azo at home.  Denies any fever, nausea, vomiting.    Differential Diagnosis:   UTI, pyelonephritis, cystitis, colitis, constipation    Clinical Tests:   Lab Tests: Ordered and Reviewed  Radiological Study: Ordered and Reviewed  ED Management:  Patient has a raging urinary tract infection with white cell count 12.  Negative for ultrasound.  Patient is started on Rocephin and given Bactrim in the ER.  Her pain improved after Toradol injection.  Patient is given prescription for Bactrim and a urine culture is been sent.  Patient is advised to follow-up with the primary care physician in next 2 days for the culture report to check for sensitivity.  Advised to follow-up ER with any worsening of symptoms along with fever, nausea or vomiting.                   ED Course      Clinical Impression:   The encounter diagnosis was Acute cystitis with hematuria.    Disposition:   Disposition: Discharged  Condition: Yvette Larson MD  01/20/18 8302

## 2018-01-23 LAB — BACTERIA UR CULT: NORMAL

## 2018-01-25 ENCOUNTER — HOSPITAL ENCOUNTER (EMERGENCY)
Facility: HOSPITAL | Age: 43
Discharge: HOME | End: 2018-01-25
Attending: EMERGENCY MEDICINE
Payer: COMMERCIAL

## 2018-01-25 VITALS
WEIGHT: 200 LBS | SYSTOLIC BLOOD PRESSURE: 137 MMHG | RESPIRATION RATE: 18 BRPM | OXYGEN SATURATION: 98 % | TEMPERATURE: 99 F | DIASTOLIC BLOOD PRESSURE: 87 MMHG | BODY MASS INDEX: 33.28 KG/M2 | HEART RATE: 109 BPM

## 2018-01-25 DIAGNOSIS — L50.9 URTICARIA: Primary | ICD-10-CM

## 2018-01-25 PROCEDURE — 96372 THER/PROPH/DIAG INJ SC/IM: CPT

## 2018-01-25 PROCEDURE — 63600175 PHARM REV CODE 636 W HCPCS: Performed by: EMERGENCY MEDICINE

## 2018-01-25 PROCEDURE — 25000003 PHARM REV CODE 250: Performed by: EMERGENCY MEDICINE

## 2018-01-25 PROCEDURE — 99283 EMERGENCY DEPT VISIT LOW MDM: CPT | Mod: 25

## 2018-01-25 RX ORDER — DIPHENHYDRAMINE HCL 50 MG
50 CAPSULE ORAL
Status: COMPLETED | OUTPATIENT
Start: 2018-01-25 | End: 2018-01-25

## 2018-01-25 RX ORDER — FAMOTIDINE 20 MG/1
20 TABLET, FILM COATED ORAL 2 TIMES DAILY
Qty: 10 TABLET | Refills: 0 | Status: SHIPPED | OUTPATIENT
Start: 2018-01-25 | End: 2018-01-25

## 2018-01-25 RX ORDER — FAMOTIDINE 20 MG/1
20 TABLET, FILM COATED ORAL 2 TIMES DAILY
Qty: 10 TABLET | Refills: 0 | Status: SHIPPED | OUTPATIENT
Start: 2018-01-25 | End: 2021-01-05

## 2018-01-25 RX ORDER — FAMOTIDINE 20 MG/1
20 TABLET, FILM COATED ORAL
Status: COMPLETED | OUTPATIENT
Start: 2018-01-25 | End: 2018-01-25

## 2018-01-25 RX ORDER — DEXAMETHASONE SODIUM PHOSPHATE 4 MG/ML
8 INJECTION, SOLUTION INTRA-ARTICULAR; INTRALESIONAL; INTRAMUSCULAR; INTRAVENOUS; SOFT TISSUE
Status: COMPLETED | OUTPATIENT
Start: 2018-01-25 | End: 2018-01-25

## 2018-01-25 RX ADMIN — FAMOTIDINE 20 MG: 20 TABLET ORAL at 05:01

## 2018-01-25 RX ADMIN — DIPHENHYDRAMINE HYDROCHLORIDE 50 MG: 50 CAPSULE ORAL at 05:01

## 2018-01-25 RX ADMIN — DEXAMETHASONE SODIUM PHOSPHATE 8 MG: 4 INJECTION, SOLUTION INTRAMUSCULAR; INTRAVENOUS at 05:01

## 2018-01-25 NOTE — ED PROVIDER NOTES
Encounter Date: 2018       History     Chief Complaint   Patient presents with    Rash     rash with itching for 3 days, states she started taking bactrim for a UTI this week     Patient started taking Bactrim 3 days ago.  Yesterday she broke out in a rash and is progressively worsened over the last 24 hours.  She has taken Bactrim in the past and had a very mild reaction the last time she took it.      The history is provided by the patient.   Rash    This is a new problem. The current episode started yesterday. The problem has been unchanged. The problem is associated with new medications. Affected Location: Generalized. The pain is at a severity of 0/10. Associated symptoms include itching. Pertinent negatives include no blisters, no pain and no weeping. She has tried antihistamines for the symptoms. The treatment provided mild relief. Risk factors include new medications.     Review of patient's allergies indicates:  No Known Allergies  Past Medical History:   Diagnosis Date    Hypertension      Past Surgical History:   Procedure Laterality Date    back injection      steroid injections x2     SECTION      CHOLECYSTECTOMY      tubal ligation       History reviewed. No pertinent family history.  Social History   Substance Use Topics    Smoking status: Never Smoker    Smokeless tobacco: Never Used    Alcohol use No     Review of Systems   Skin: Positive for itching and rash.   All other systems reviewed and are negative.      Physical Exam     Initial Vitals [18 0542]   BP Pulse Resp Temp SpO2   137/87 109 18 98.7 °F (37.1 °C) 98 %      MAP       103.67         Physical Exam    Nursing note and vitals reviewed.  Constitutional: She appears well-developed and well-nourished.   HENT:   Head: Normocephalic and atraumatic.   Eyes: EOM are normal.   Neck: Normal range of motion. Neck supple.   Cardiovascular: Normal rate, regular rhythm, normal heart sounds and intact distal pulses.    Pulmonary/Chest: Breath sounds normal.   Abdominal: Soft.   Musculoskeletal: Normal range of motion.   Neurological: She is alert and oriented to person, place, and time.   Skin: Skin is warm and dry. Capillary refill takes less than 2 seconds.   Patient has a generalized erythemic urticarial rash.  His most prominent on her upper torso and face.  It is also prominent less so on her extremities.  She states it does itch.   Psychiatric: She has a normal mood and affect. Her behavior is normal. Judgment and thought content normal.         ED Course   Procedures  Labs Reviewed - No data to display                            ED Course      Clinical Impression:   The encounter diagnosis was Urticaria.    Disposition:   Disposition: Discharged  Condition: Stable                        Sharyn Luis MD  01/25/18 0557

## 2018-01-26 LAB
BACTERIA BLD CULT: NORMAL
BACTERIA BLD CULT: NORMAL

## 2018-12-05 NOTE — PLAN OF CARE
Subjective:  
23 y.o. female for Well Woman Check. Her gyne and breast care is done elsewhere by her Ob-Gyne physician. Patient Active Problem List  
Diagnosis Code  Anxiety F41.9  Pregnancy Z34.90 Patient Active Problem List  
 Diagnosis Date Noted  Pregnancy 12/05/2018  Anxiety 01/01/2014 Current Outpatient Medications Medication Sig Dispense Refill  [id number] stephanie-folic-dha (PRENATAL DHA+COMPLETE PRENATAL) -300 mg-mcg-mg cmpk Take  by mouth. Allergies Allergen Reactions  Penicillins Hives Past Medical History:  
Diagnosis Date  Anxiety 2014  Pregnancy No past surgical history on file. Family History Problem Relation Age of Onset  Hypertension Mother  No Known Problems Father  No Known Problems Brother  No Known Problems Brother  No Known Problems Sister Social History Tobacco Use  Smoking status: Never Smoker  Smokeless tobacco: Never Used Substance Use Topics  Alcohol use: No  
  Frequency: Never ROS:  
Review of Systems Constitutional: Negative. HENT:  
     Last dental exam: 2017 Eyes:  
     Wears corrective lenses Last eye exam: 2017 Respiratory: Negative. Cardiovascular: Negative. Gastrointestinal: Negative. Genitourinary:  
     16 weeks pregnant Musculoskeletal: Positive for joint pain. History of hip pain: \"they tend to hurt a lot\" , can hurt so bad unable to roll over Ongoing for three weeks, no pain for last 1 1/2 weeks Skin:  
     Facial acne Neurological: Positive for headaches. Migraines: about once a week, unilateral with photophobia no phonophobia, just lies down and sleeps until the pain is better Endo/Heme/Allergies: Negative. Psychiatric/Behavioral: The patient is nervous/anxious. PMH anxiety, treated some when in the [employer], never on medications Specific concerns today: none. Objective: VSS. No changes noted. Patient states that pain is much better and that it has come down from a 9/10 to a 3/10 and that she is comfortable and ready to go back to her outpatient room. Anesthesia cleared patient to be released to outpatient. Will transfer patient to room.    The patient appears well, alert, oriented x 3, in no distress. Visit Vitals BP (!) 145/97 (BP 1 Location: Left arm, BP Patient Position: At rest) Pulse 93 Temp 98.6 °F (37 °C) Resp 16 Ht 5' 4\" (1.626 m) Wt 164 lb (74.4 kg) LMP 08/15/2018 SpO2 97% BMI 28.15 kg/m² Physical Exam  
Constitutional: She is oriented to person, place, and time and well-developed, well-nourished, and in no distress. HENT:  
Head: Normocephalic and atraumatic. Right Ear: Hearing, tympanic membrane, external ear and ear canal normal.  
Left Ear: Hearing, tympanic membrane, external ear and ear canal normal.  
Mouth/Throat: Uvula is midline, oropharynx is clear and moist and mucous membranes are normal.  
Eyes: Conjunctivae and lids are normal. Pupils are equal, round, and reactive to light. Neck: Normal range of motion and full passive range of motion without pain. Neck supple. Cardiovascular: Normal rate, regular rhythm, S1 normal and S2 normal.  
Pulmonary/Chest: Effort normal and breath sounds normal.  
Abdominal: Soft. Normal appearance and bowel sounds are normal. There is no tenderness. There is no CVA tenderness. Musculoskeletal: Normal range of motion. Lymphadenopathy:  
  She has no cervical adenopathy. Neurological: She is alert and oriented to person, place, and time. She has normal sensation, normal strength and intact cranial nerves. Gait normal.  
Skin: Skin is warm, dry and intact. Psychiatric: Mood, memory, affect and judgment normal.  
Nursing note and vitals reviewed. Mrs. Vinh Luu is a pleasant 17yo  female who comes to the office for a yearly physical. States she is 16 weeks pregnant. Has been seeing a OB in [place] where visits family frequently. She is not sure how much longer she will be in this area as her boyfriend is getting out of the [employer] in January. Her next OB visit is in 2 weeks in [place].  I suggested she establish with an OB in the area as she does not know how long she will be here and that will assure her good pregnancy care. She requested no labs today as she has had them done at her OB. If she stays longer in this area she will give us a release of records from her physician in [place]. Assessment/Plan:  
Well Woman 
limit alcohol consumption, continue present diet with no restrictions, call if any problems ICD-10-CM ICD-9-CM 1. Well woman exam (no gynecological exam) Z00.00 V70.0 [V70.0] 2. 16 weeks gestation of pregnancy Z3A.16 V22.2 An After Visit Summary was printed and given to the patient. All diagnosis have been discussed with the patient and all of the patient's questions have been answered. Follow-up Disposition: 
Return in about 1 year (around 12/5/2019), or if symptoms worsen or fail to improve, for cpe. Arielle Gabriel, AGNP-BC [facility] [address] [facility] [place]

## 2020-04-13 ENCOUNTER — TELEPHONE (OUTPATIENT)
Dept: NEUROSURGERY | Facility: CLINIC | Age: 45
End: 2020-04-13

## 2020-04-17 ENCOUNTER — TELEPHONE (OUTPATIENT)
Dept: NEUROSURGERY | Facility: CLINIC | Age: 45
End: 2020-04-17

## 2020-04-27 ENCOUNTER — HOSPITAL ENCOUNTER (OUTPATIENT)
Dept: RADIOLOGY | Facility: HOSPITAL | Age: 45
Discharge: HOME | End: 2020-04-27
Attending: FAMILY MEDICINE
Payer: COMMERCIAL

## 2020-04-27 DIAGNOSIS — M54.16 LUMBAR RADICULOPATHY: ICD-10-CM

## 2020-04-27 PROCEDURE — 72148 MRI LUMBAR SPINE W/O DYE: CPT | Mod: TC

## 2020-04-27 PROCEDURE — 72148 MRI LUMBAR SPINE WITHOUT CONTRAST: ICD-10-PCS | Mod: 26,,, | Performed by: RADIOLOGY

## 2020-04-27 PROCEDURE — 72148 MRI LUMBAR SPINE W/O DYE: CPT | Mod: 26,,, | Performed by: RADIOLOGY

## 2020-04-30 DIAGNOSIS — M54.16 LUMBAR RADICULOPATHY: Primary | ICD-10-CM

## 2020-05-04 ENCOUNTER — OFFICE VISIT (OUTPATIENT)
Dept: NEUROSURGERY | Facility: CLINIC | Age: 45
End: 2020-05-04
Payer: COMMERCIAL

## 2020-05-04 DIAGNOSIS — M51.26 LUMBAR DISC HERNIATION: Primary | ICD-10-CM

## 2020-05-04 DIAGNOSIS — M54.17 LUMBOSACRAL RADICULOPATHY AT L4: ICD-10-CM

## 2020-05-04 PROCEDURE — 99443 PR PHYSICIAN TELEPHONE EVALUATION 21-30 MIN: ICD-10-PCS | Mod: 95,,, | Performed by: NEUROLOGICAL SURGERY

## 2020-05-04 PROCEDURE — 99443 PR PHYSICIAN TELEPHONE EVALUATION 21-30 MIN: CPT | Mod: 95,,, | Performed by: NEUROLOGICAL SURGERY

## 2020-05-04 RX ORDER — GABAPENTIN 300 MG/1
300 CAPSULE ORAL 3 TIMES DAILY
Qty: 90 CAPSULE | Refills: 11 | Status: SHIPPED | OUTPATIENT
Start: 2020-05-04 | End: 2020-07-06

## 2020-05-04 NOTE — PROGRESS NOTES
Established Patient - Audio Only Telehealth Visit     The patient location is:  Home  The chief complaint leading to consultation is:  Left ankle pain  Visit type: Virtual visit with audio only (telephone)  Total time spent with patient:  30 min       The reason for the audio only service rather than synchronous audio and video virtual visit was related to technical difficulties or patient preference/necessity.     Each patient to whom I provide medical services by telemedicine is:  (1) informed of the relationship between the physician and patient and the respective role of any other health care provider with respect to management of the patient; and (2) notified that they may decline to receive medical services by telemedicine and may withdraw from such care at any time. Patient verbally consented to receive this service via voice-only telephone call.    NEUROSURGICAL OUTPATIENT CONSULTATION NOTE    DATE OF SERVICE:  05/04/2020    ATTENDING PHYSICIAN:  Singh Benoit MD    CONSULT REQUESTED BY:  Dr Pugh    REASON FOR CONSULT:  Left ankle pain    SUBJECTIVE:    HISTORY OF PRESENT ILLNESS:  This is a very pleasant 44 y.o. female who has been reporting worsening left ankle pain for about 2 years.  Pain is increased by weight-bearing.  She also reports prior history of left anterior thigh pain 5 years ago.  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  She thinks her leg pain is related to the ankle pain that she has at this time.  She was told the she had abnormality in her lumbar spine that was explaining the left ankle pain.  She feels some weakness in the left ankle.  No sphincter dysfunction.  She has received in the past multiple spinal injections for low back pain.  The last ALYSSA was done 2 years ago.  She never had a nerve conduction study.  She is not taking gabapentin.                PAST MEDICAL HISTORY:  Active Ambulatory Problems     Diagnosis Date Noted     Neck mass 2017     Resolved Ambulatory Problems     Diagnosis Date Noted    No Resolved Ambulatory Problems     Past Medical History:   Diagnosis Date    Hypertension        PAST SURGICAL HISTORY:  Past Surgical History:   Procedure Laterality Date    back injection      steroid injections x2     SECTION      CHOLECYSTECTOMY      tubal ligation         SOCIAL HISTORY:   Social History     Socioeconomic History    Marital status:      Spouse name: Not on file    Number of children: Not on file    Years of education: Not on file    Highest education level: Not on file   Occupational History    Not on file   Social Needs    Financial resource strain: Not on file    Food insecurity:     Worry: Not on file     Inability: Not on file    Transportation needs:     Medical: Not on file     Non-medical: Not on file   Tobacco Use    Smoking status: Never Smoker    Smokeless tobacco: Never Used   Substance and Sexual Activity    Alcohol use: No    Drug use: No    Sexual activity: Not on file   Lifestyle    Physical activity:     Days per week: Not on file     Minutes per session: Not on file    Stress: Not on file   Relationships    Social connections:     Talks on phone: Not on file     Gets together: Not on file     Attends Oriental orthodox service: Not on file     Active member of club or organization: Not on file     Attends meetings of clubs or organizations: Not on file     Relationship status: Not on file   Other Topics Concern    Not on file   Social History Narrative    Not on file       FAMILY HISTORY:  No family history on file.    CURRENTS MEDICATIONS:  Current Outpatient Medications on File Prior to Visit   Medication Sig Dispense Refill    amLODIPine (NORVASC) 10 MG tablet Take 10 mg by mouth once daily.      celecoxib (CELEBREX) 200 MG capsule Take 200 mg by mouth 2 (two) times daily.      famotidine (PEPCID) 20 MG tablet Take 1 tablet (20 mg total) by mouth 2 (two) times  daily. 10 tablet 0    hydrocodone-acetaminophen 5-325mg (NORCO) 5-325 mg per tablet Take 1 tablet by mouth every 4 to 6 hours as needed for Pain. 21 tablet 0     No current facility-administered medications on file prior to visit.        ALLERGIES:  Review of patient's allergies indicates:  No Known Allergies    REVIEW OF SYSTEMS:  ROS    OBJECTIVE:    PHYSICAL EXAMINATION:   There were no vitals filed for this visit.    Neurosurgery Physical Exam    Ortho Exam        Neurologic Exam      DIAGNOSTIC DATA:  I personally interpreted the following imaging:   MRI of the lumbar spine 04/27/20:  Left L4-5 intraforaminal disc bulge causing moderate stenosis and possible compression of the left exiting L4 nerve root, right L5-S1 disc annulus tear without stenosis.  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     ASSESMENT:  More than 50% of the time was spent on discussing conservative management treatments (medication, physical therapy exercises) and possible interventions (spinal injections and surgical procedures). Care coordination was discussed.        This is a 44 y.o. female with     Problem List Items Addressed This Visit     None      Visit Diagnoses     Lumbar disc herniation    -  Primary    Lumbosacral radiculopathy at L4              PLAN:  Pain management referral for left L4-5 transforaminal epidural steroid injection  Gabapentin 300 mg 3 times daily  Follow-up 2 weeks after transforaminal epidural steroid injection  All questions answered        Singh Benoit MD  Cell:[phone]                          This service was not originating from a related E/M service provided within the previous 7 days nor will  to an E/M service or procedure within the next 24 hours or my soonest available appointment.  Prevailing standard of care was able to be met in this audio-only visit.

## 2020-05-25 ENCOUNTER — OFFICE VISIT (OUTPATIENT)
Dept: PAIN MEDICINE | Facility: CLINIC | Age: 45
End: 2020-05-25
Payer: COMMERCIAL

## 2020-05-25 VITALS
WEIGHT: 211 LBS | SYSTOLIC BLOOD PRESSURE: 118 MMHG | DIASTOLIC BLOOD PRESSURE: 84 MMHG | BODY MASS INDEX: 35.11 KG/M2 | HEART RATE: 73 BPM

## 2020-05-25 DIAGNOSIS — M72.2 PLANTAR FASCIITIS OF LEFT FOOT: ICD-10-CM

## 2020-05-25 DIAGNOSIS — M54.17 LUMBOSACRAL RADICULOPATHY AT L4: ICD-10-CM

## 2020-05-25 DIAGNOSIS — M51.26 LUMBAR DISC HERNIATION: ICD-10-CM

## 2020-05-25 DIAGNOSIS — M76.62 ACHILLES TENDINITIS, LEFT LEG: Primary | ICD-10-CM

## 2020-05-25 PROCEDURE — 99999 PR PBB SHADOW E&M-EST. PATIENT-LVL III: CPT | Mod: PBBFAC,,, | Performed by: PAIN MEDICINE

## 2020-05-25 PROCEDURE — 99204 OFFICE O/P NEW MOD 45 MIN: CPT | Mod: S$GLB,,, | Performed by: PAIN MEDICINE

## 2020-05-25 PROCEDURE — 99999 PR PBB SHADOW E&M-EST. PATIENT-LVL III: ICD-10-PCS | Mod: PBBFAC,,, | Performed by: PAIN MEDICINE

## 2020-05-25 PROCEDURE — 99204 PR OFFICE/OUTPT VISIT, NEW, LEVL IV, 45-59 MIN: ICD-10-PCS | Mod: S$GLB,,, | Performed by: PAIN MEDICINE

## 2020-05-25 RX ORDER — DICLOFENAC SODIUM 10 MG/G
2 GEL TOPICAL 4 TIMES DAILY
Qty: 200 G | Refills: 2 | Status: SHIPPED | OUTPATIENT
Start: 2020-05-25 | End: 2020-07-06

## 2020-05-25 NOTE — LETTER
May 25, 2020      Singh Benoit MD  [address]  [address]  [place] [address]           [facility]  [address]  [facility] [address]  Phone: [phone]          Patient: Sofie Muniz   MR Number: [id number]   YOB: 1975   Date of Visit: 5/25/2020       Dear Dr. Singh Benoit:    Thank you for referring Sofie Muniz to me for evaluation. Attached you will find relevant portions of my assessment and plan of care.    If you have questions, please do not hesitate to call me. I look forward to following Sofie Muniz along with you.    Sincerely,    Michael Epperson Jr., MD    Enclosure  CC:  No Recipients    If you would like to receive this communication electronically, please contact [email or web address] or [phone] to request more information on [employer] Link access.    For providers and/or their staff who would like to refer a patient to [facility], please contact us through our one-stop-shop provider referral line, [facility], at [phone].    If you feel you have received this communication in error or would no longer like to receive these types of communications, please e-mail [email or web address]

## 2020-05-25 NOTE — PROGRESS NOTES
[facility] New Patient Evaluation    Referred by: Singh Benoit MD   Reason for referral:  Lumbar disc herniation      Lumbosacral radiculopathy at L4     CC:   Chief Complaint   Patient presents with    Foot Pain     Last 3 PDI Scores 5/25/2020   Pain Disability Index (PDI) 60       HPI:   Sofie Muniz is a 44 y.o. female who complains of pain and tenderness in the left heel along with burning at the base of the Achilles tendon.  Pain in the bottom of her foot radiates toward the toes.  She denies any radiation of this pain into the interspace between the 1st and 2nd toes.  She also denies any associated numbness and tingling anywhere in the left lower extremity.  She reports a history of back problems with pain radiating into the left hip and left knee, both of which were previously treated with back injections.  She adamantly denies the sensation of pain traveling from her back and into her lower extremity.    Location: left heel   Onset: 6 months  Current Pain Score: 10/10  Daily Pain of Range: 10-8/10  Quality: Burning  Radiation: none  Worsened by: standing and walking, and stepping   Improved by: medications    Previous Therapies:  PT/OT: Denies  HEP: Yes, but limited due to COVID and left foot pain  Interventions: Denies foot/back injections for the current complaint.  Remote Hx of back injections.  Surgery: Denies spine, ankle, foot surgery  Medications:   - NSAIDS:   - MSK Relaxants:   - TCAs:   - SNRIs:   - Topicals:   - Anticonvulsants:  - Opioids:     Current Pain Medications:  1. Gabapentin 300mg TID  2. Celebrex 200mg BID     Review of Systems:  Review of Systems   Constitutional: Negative for chills and fever.   HENT: Negative for nosebleeds.    Eyes: Negative for blurred vision and pain.   Respiratory: Negative for hemoptysis.    Cardiovascular: Negative for chest pain and palpitations.   Gastrointestinal: Negative for heartburn, nausea and vomiting.   Genitourinary: Negative for  dysuria and hematuria.   Musculoskeletal: Positive for back pain. Negative for myalgias.   Skin: Negative for rash.   Neurological: Negative for tingling, sensory change, focal weakness, seizures and loss of consciousness.   Endo/Heme/Allergies: Does not bruise/bleed easily.   Psychiatric/Behavioral: Negative for hallucinations.       History:    Current Outpatient Medications:     amLODIPine (NORVASC) 10 MG tablet, Take 10 mg by mouth once daily., Disp: , Rfl:     celecoxib (CELEBREX) 200 MG capsule, Take 200 mg by mouth 2 (two) times daily., Disp: , Rfl:     gabapentin (NEURONTIN) 300 MG capsule, Take 1 capsule (300 mg total) by mouth 3 (three) times daily., Disp: 90 capsule, Rfl: 11    famotidine (PEPCID) 20 MG tablet, Take 1 tablet (20 mg total) by mouth 2 (two) times daily., Disp: 10 tablet, Rfl: 0    hydrocodone-acetaminophen 5-325mg (NORCO) 5-325 mg per tablet, Take 1 tablet by mouth every 4 to 6 hours as needed for Pain., Disp: 21 tablet, Rfl: 0    Past Medical History:   Diagnosis Date    Hypertension        Past Surgical History:   Procedure Laterality Date    back injection      steroid injections x2     SECTION      CHOLECYSTECTOMY      tubal ligation         No family history on file.    Social History     Socioeconomic History    Marital status:      Spouse name: Not on file    Number of children: Not on file    Years of education: Not on file    Highest education level: Not on file   Occupational History    Not on file   Social Needs    Financial resource strain: Not on file    Food insecurity:     Worry: Not on file     Inability: Not on file    Transportation needs:     Medical: Not on file     Non-medical: Not on file   Tobacco Use    Smoking status: Never Smoker    Smokeless tobacco: Never Used   Substance and Sexual Activity    Alcohol use: No    Drug use: No    Sexual activity: Not on file   Lifestyle    Physical activity:     Days per week: Not on file      Minutes per session: Not on file    Stress: Not on file   Relationships    Social connections:     Talks on phone: Not on file     Gets together: Not on file     Attends Cheondoism service: Not on file     Active member of club or organization: Not on file     Attends meetings of clubs or organizations: Not on file     Relationship status: Not on file   Other Topics Concern    Not on file   Social History Narrative    Not on file       Review of patient's allergies indicates:   Allergen Reactions    Bactrim [sulfamethoxazole-trimethoprim] Rash       Physical Exam:  Vitals:    05/25/20 1109   Weight: 95.7 kg (211 lb)   PainSc: 10-Worst pain ever     General    Nursing note and vitals reviewed.  Constitutional: She is oriented to person, place, and time. She appears well-developed and well-nourished. No distress.   HENT:   Head: Normocephalic and atraumatic.   Nose: Nose normal.   Eyes: Conjunctivae and EOM are normal. Pupils are equal, round, and reactive to light. Right eye exhibits no discharge. Left eye exhibits no discharge. No scleral icterus.   Neck: No JVD present.   Cardiovascular: Intact distal pulses.    Pulmonary/Chest: Effort normal. No respiratory distress.   Abdominal: She exhibits no distension.   Neurological: She is alert and oriented to person, place, and time. Coordination normal.   Psychiatric: She has a normal mood and affect. Her behavior is normal. Judgment and thought content normal.     General Musculoskeletal Exam   Gait: antalgic     Left Ankle/Foot Exam     Inspection  Deformity: absent  Erythema: absent  Effusion: Ankle - absent Foot - absent  Atrophy: Ankle - absent Foot - absent  Scars: absent    Tenderness   The patient is tender to palpation of the plantar arch and Achilles insertion.    Other   Sensation: normal      Vascular Exam       Left Pulses  Dorsalis Pedis:      2+  Posterior Tibial:      2+        Edema  Left Lower Leg: absent      Imaging:  MRI LUMBAR SPINE WITHOUT  CONTRAST  FINDINGS:  The vertebral bodies demonstrate no evidence of fracture, osseous destructive process or aggressive bone marrow replacement process.  Normal sagittal alignment is preserved.  No spondylolisthesis.  Degenerative changes individual disc levels further discussed below:  T12-L1, L1-2, L2-3: No significant degenerative disc disease, spinal stenosis or neural foraminal narrowing.  L3-4: Mild disc desiccation and endplate marginal osteophyte formation.  Modest facet arthropathy.  No spinal stenosis or neural foraminal narrowing.  L4-5: Left-sided annular fissure.  Minimal disc bulging.  Mild endplate marginal osteophyte formation.  Mild facet arthropathy.  No significant spinal stenosis, but there is modest bilateral lateral recess neural foraminal encroachment.  L5-S1: Right-sided annular fissure at the level of lateral recess and neural foramen.  Shallow disc protrusion suggested at this site without significant mass effect.  Mild facet arthropathy with a small effusion on the right.  No significant spinal stenosis or neural foraminal narrowing.  The terminal spinal cord, conus, and cauda equina demonstrate normal caliber, morphology, and signal.  Multiple perineural root sleeve cysts in the lower thoracic and lower lumbar spine as well as the sacrum.  No spinal canal or paraspinous mass is identified.  1 cm T2 hyperintense lesion on the posterior aspect of the right kidney (sequence 7 image 11).  No acute abnormalities identified in the paraspinal soft tissues.       Labs:  BMP  Lab Results   Component Value Date     01/20/2018    K 4.0 01/20/2018     01/20/2018    CO2 26 01/20/2018    BUN 11 01/20/2018    CREATININE 0.66 01/20/2018    CALCIUM 9.1 01/20/2018    ANIONGAP 12 01/20/2018    ESTGFRAFRICA >60.0 01/20/2018    EGFRNONAA >60.0 01/20/2018     Lab Results   Component Value Date    ALT 42 01/20/2018    AST 32 01/20/2018    ALKPHOS 86 01/20/2018    BILITOT 0.7 01/20/2018        Assessment:  Problem List Items Addressed This Visit     Lumbar disc herniation    Lumbosacral radiculopathy at L4    Achilles tendinitis, left leg - Primary    Relevant Medications    diclofenac sodium (VOLTAREN) 1 % Gel    Other Relevant Orders    Ambulatory referral/consult to Podiatry    Plantar fasciitis of left foot    Relevant Medications    diclofenac sodium (VOLTAREN) 1 % Gel    Other Relevant Orders    Ambulatory referral/consult to Podiatry          5/25/20 - Sofie Muniz is a 44 y.o. female with chronic pain of the left heel and ankle.  Patient was referred for consideration of lumbosacral radiculopathy at L4; however, despite the presence multilevel neural foraminal stenosis affecting the lower lumbar spine and the presence of an annular tear in the posterior aspect of the L5-S1 intervertebral disc, she is not describing symptoms consistent with radiculopathy.  She is describing point tenderness felt as aching and throbbing primarily in the plantar surfaces of the foot, at the heel and she is describing burning at the insertion point for the Achilles tendon, also at the heel.  She reports that pain on the bottom of her foot started 1st followed by pain in the Achilles tendon.  I believe she is experiencing primary plantar fasciitis along with a secondary Achilles tendinitis likely related to changes in her gait to accommodate the pain.  Patient was advised on a combination stretching and strengthening exercises for the foot, and I recommended a trial of diclofenac gel.  She does have a history radicular pain originating in the spine and lumbar radiculopathy remains a my differential; however, based on the location of the symptom I would consider an injection at the foramina of L4-5 and L5-S1 on the left side.  I placed a referral to podiatry and advised her to keep the appointment if her symptoms do not improve or resolve over the course of the next 4-6 weeks.    : Reviewed and consistent with  medication use as prescribed.        Follow Up: RTC 4-6 weeks    Michael Epperson Jr, MD  Interventional Pain Medicine / Anesthesiology    Disclaimer: This note was partly generated using dictation software which may occasionally result in transcription errors.

## 2020-07-06 ENCOUNTER — OFFICE VISIT (OUTPATIENT)
Dept: PODIATRY | Facility: CLINIC | Age: 45
End: 2020-07-06
Payer: COMMERCIAL

## 2020-07-06 VITALS
WEIGHT: 211 LBS | HEART RATE: 66 BPM | DIASTOLIC BLOOD PRESSURE: 86 MMHG | HEIGHT: 65 IN | BODY MASS INDEX: 35.16 KG/M2 | SYSTOLIC BLOOD PRESSURE: 138 MMHG

## 2020-07-06 DIAGNOSIS — M76.62 ACHILLES TENDONITIS, BILATERAL: ICD-10-CM

## 2020-07-06 DIAGNOSIS — M72.2 PLANTAR FASCIITIS, BILATERAL: ICD-10-CM

## 2020-07-06 DIAGNOSIS — M76.61 ACHILLES TENDONITIS, BILATERAL: ICD-10-CM

## 2020-07-06 PROCEDURE — 99203 PR OFFICE/OUTPT VISIT, NEW, LEVL III, 30-44 MIN: ICD-10-PCS | Mod: S$GLB,,, | Performed by: PODIATRIST

## 2020-07-06 PROCEDURE — 99203 OFFICE O/P NEW LOW 30 MIN: CPT | Mod: S$GLB,,, | Performed by: PODIATRIST

## 2020-07-06 PROCEDURE — 99999 PR PBB SHADOW E&M-EST. PATIENT-LVL IV: CPT | Mod: PBBFAC,,, | Performed by: PODIATRIST

## 2020-07-06 PROCEDURE — 99999 PR PBB SHADOW E&M-EST. PATIENT-LVL IV: ICD-10-PCS | Mod: PBBFAC,,, | Performed by: PODIATRIST

## 2020-07-06 RX ORDER — LISINOPRIL 10 MG/1
TABLET ORAL
COMMUNITY
Start: 2019-09-13 | End: 2020-10-22

## 2020-07-06 RX ORDER — DESLORATADINE 5 MG/1
TABLET ORAL
COMMUNITY
Start: 2019-07-26 | End: 2020-10-22

## 2020-07-06 RX ORDER — METHYLPREDNISOLONE 4 MG/1
TABLET ORAL
Qty: 1 PACKAGE | Refills: 0 | Status: SHIPPED | OUTPATIENT
Start: 2020-07-06 | End: 2020-07-27

## 2020-07-06 NOTE — PATIENT INSTRUCTIONS
Recommend [employer] One tennis shoes from [employer] in [place]      Bent-Knee Calf Stretch    This exercise is designed to stretch and strengthen your feet and ankles. Before beginning the exercise, read through all the instructions. While exercising, breathe normally and dont bounce. If you feel any pain, stop the exercise. If pain persists, inform your healthcare provider:  · Stand an arms length away from a wall. Place the palms of your hands on the wall. Step forward about 12 inches with your ______ foot.  · Keeping toes pointed forward and both heels on the floor, bend both knees and lean forward. Hold for __30____ seconds. Relax.  · Repeat __3____ times. Do __2-3____ sets a day.  Date Last Reviewed: 8/16/2015  © 6844-7100 [employer]. [address] [place] [address], [place] [address]. All rights reserved. This information is not intended as a substitute for professional medical care. Always follow your healthcare professional's instructions.

## 2020-07-06 NOTE — LETTER
July 6, 2020      Michael Epperson Jr., MD  [address]  [address]  [place] [address]           [facility]  [address]  [place] [address]  Phone: [phone]  Fax: [phone]          Patient: Sofie Muniz   MR Number: [id number]   YOB: 1975   Date of Visit: 7/6/2020       Dear Dr. Michael Epperson Jr.:    Thank you for referring Sofie Muniz to me for evaluation. Attached you will find relevant portions of my assessment and plan of care.    If you have questions, please do not hesitate to call me. I look forward to following Sofie Muniz along with you.    Sincerely,    Jamie Maya, DPM    Enclosure  CC:  No Recipients    If you would like to receive this communication electronically, please contact [email or web address] or [phone] to request more information on [employer] Link access.    For providers and/or their staff who would like to refer a patient to [facility], please contact us through our one-stop-shop provider referral line, [facility], at [phone].    If you feel you have received this communication in error or would no longer like to receive these types of communications, please e-mail [email or web address]

## 2020-07-06 NOTE — PROGRESS NOTES
"Subjective:      Patient ID: Sofie Muniz is a 45 y.o. female.    Chief Complaint: Plantar Fasciitis (Left)    Patient has history of back pain that radiates to left lower extremity treated per Dr. Eppreson whom referred to our clinic for a left ft and ankle pain.  She reports about 1 year history pain to her heels especially to the left foot.  Pain is worse with ambulation.  Reports that it is burning in nature and alleviated by rest.    Vitals:    20 0859   BP: 138/86   Pulse: 66   Weight: 95.7 kg (211 lb)   Height: 5' 5" (1.651 m)   PainSc:   5   PainLoc: Foot      Past Medical History:   Diagnosis Date    Hypertension        Past Surgical History:   Procedure Laterality Date    back injection      steroid injections x2     SECTION      CHOLECYSTECTOMY      tubal ligation         No family history on file.    Social History     Socioeconomic History    Marital status:      Spouse name: Not on file    Number of children: Not on file    Years of education: Not on file    Highest education level: Not on file   Occupational History    Not on file   Social Needs    Financial resource strain: Not on file    Food insecurity     Worry: Not on file     Inability: Not on file    Transportation needs     Medical: Not on file     Non-medical: Not on file   Tobacco Use    Smoking status: Never Smoker    Smokeless tobacco: Never Used   Substance and Sexual Activity    Alcohol use: No    Drug use: No    Sexual activity: Not on file   Lifestyle    Physical activity     Days per week: Not on file     Minutes per session: Not on file    Stress: Not on file   Relationships    Social connections     Talks on phone: Not on file     Gets together: Not on file     Attends Religion service: Not on file     Active member of club or organization: Not on file     Attends meetings of clubs or organizations: Not on file     Relationship status: Not on file   Other Topics Concern    Not on file "   Social History Narrative    Not on file       Current Outpatient Medications   Medication Sig Dispense Refill    desloratadine (CLARINEX) 5 mg tablet 1 tablet as needed      lisinopriL 10 MG tablet 1 tablet      celecoxib (CELEBREX) 200 MG capsule Take 200 mg by mouth 2 (two) times daily.      famotidine (PEPCID) 20 MG tablet Take 1 tablet (20 mg total) by mouth 2 (two) times daily. 10 tablet 0    methylPREDNISolone (MEDROL DOSEPACK) 4 mg tablet use as directed 1 Package 0     No current facility-administered medications for this visit.        Review of patient's allergies indicates:   Allergen Reactions    Bactrim [sulfamethoxazole-trimethoprim] Rash         Review of Systems   Constitution: Negative for chills, decreased appetite, fever and night sweats.   HENT: Negative for congestion and hearing loss.    Cardiovascular: Positive for leg swelling. Negative for chest pain, claudication, cyanosis and irregular heartbeat.   Respiratory: Negative for cough and shortness of breath.    Skin: Negative for color change, dry skin, itching, nail changes, poor wound healing, rash, suspicious lesions and unusual hair distribution.   Musculoskeletal: Negative for arthritis, back pain, falls, gout, joint pain, joint swelling and muscle weakness.        Pain left foot   Gastrointestinal: Negative for nausea and vomiting.   Neurological: Negative for dizziness, headaches, loss of balance, numbness and weakness.   Psychiatric/Behavioral: Negative for altered mental status and memory loss.           Objective:      Physical Exam  Constitutional:       General: She is not in acute distress.     Appearance: Normal appearance. She is not ill-appearing.   Cardiovascular:      Pulses:           Dorsalis pedis pulses are 2+ on the right side and 2+ on the left side.        Posterior tibial pulses are 2+ on the right side and 2+ on the left side.   Musculoskeletal:      Comments: Patient is obese with genu valgum L>R.  Overall  rectus foot type without significant digital deformities.  Patient has equinus bilateral -3 degrees with minor reduction with knee flexion.    Tenderness to palpation along the plantar fascia insertion, left foot.    Tenderness along the insertion of the Achilles proximally the tendon near the midsubstance without palpable defect, left.  No localized edema or discoloration.    No pain with inversion or eversion the foot, left.    Tenderness over the tarsal tunnels bilaterally L>R.  Positive Tinel sign overlying the tarsal tunnel left.   Skin:     General: Skin is warm.      Capillary Refill: Capillary refill takes less than 2 seconds.      Findings: No ecchymosis or erythema.      Nails: There is no clubbing.        Comments: Examination of the skin reveals no evidence of significant rashes, open lesions, suspicious appearing nevi or other concerning lesions.      Neurological:      Mental Status: She is alert and oriented to person, place, and time.      Motor: Motor function is intact.      Comments: (+) Tinel's sign posterior tibial bilateral, saphenous bilateral, superficial peroneal, right  Light touch intact.  MMT 5/5 DF, PF, Inv, Ev  Normal muscle tone.  No signs of atrophy.  No tremor or spasticity.       Biomechanical Exam:  Non weight bearing assessment:   Right (degrees) Left (degrees)   Malleolar position 15-20 15-20   Ankle DF (knee extended) -3 -3   Ankle DF (knee flexed) 0 0   Heel Inversion 20 20   Heel Eversion 10 10   STJ Neutral Position 0 0   Forefoot to rearfoot (1-5) perp perp   Forefoot to Rearfoot (2-5) perp perp   First Ray Dorsiflextion 5mm 5mm   First ray plantarflextion 5mm 5mm   First ray Neutral Position 0mm 0mm   Hallux Dorsiflexion 65 65   Hallux plantarflexion 30 30      Musculoskeletal evaluation, Range of Motion    Normal Limited Excessive pain   Ankle DF  R/L     Ankle PF R/L      STJ supination R/L      STJ pronation R/L      Hallux DF R/L      Hallux PF R/L      Lesser digits DF  R/L      Lesser Digits PF R/L        Muscle Strength   Right Left   Gastrocnemius 5 5   Soleus 5 5   Tib. Posterior 5 5   FDL 5 5   FHL 5 5   FDB 5 5   Tib Anterior 5 5   EDL 5 5   EHL 5 5   EDB 5 5   Peroneus Longus 5 5   Peroneus Brevis 5 5       Sagittal Plane Normal B/L    Transverse Plane Normal B/L    Ankle Morphology Normal B/L    Genu valgum b/l.      Gait Analysis  Gait Pattern: Antalgic      Right Left   Angle of Gait 8 8   Base of Gait 5cm 5cm       Heel Position Right Left   Contact sup sup   Mid-stance Pro Pro   Propulsion sup sup   Swing sup sup       Heel off: Early Both  Abductory twist?  No Both                  Assessment:       Encounter Diagnoses   Name Primary?    Plantar fasciitis, bilateral     Achilles tendonitis, bilateral          Plan:       Sofie was seen today for plantar fasciitis.    Diagnoses and all orders for this visit:    Plantar fasciitis, bilateral  -     Ambulatory referral/consult to Podiatry  -     Ambulatory referral/consult to Physical/Occupational Therapy; Future    Achilles tendonitis, bilateral  -     Ambulatory referral/consult to Podiatry  -     Ambulatory referral/consult to Physical/Occupational Therapy; Future    Other orders  -     methylPREDNISolone (MEDROL DOSEPACK) 4 mg tablet; use as directed      I counseled the patient on her conditions, their implications and medical management.    Rx: medrol dose pack and physical therapy.    Patient will stretch the tendo achilles complex three times daily as demonstrated in the office.  Literature was dispensed illustrating proper stretching technique.    Patient instructed on adequate icing techniques. Patient should ice the affected area at least once per day x 10 minutes for 10 days . I advised the  patient that extra icing would also be beneficial to ensure adequate anti inflammatory effect     Patient instructed to avoid barefoot walking and flat shoes. Patient was educated on proper shoe gear with a stiff shank  and support: Recommend hoka one neutral tennis shoes.     Patient to follow up in 6 weeks for re-evaluation. She may benefit for further evaluation of peripheral nerves if pain does not improve.     Tadeo Small DPM, PGY-3    I have personally taken the history and examined this patient and agree with the resident's note as stated as above.   Jamie Maya DPM, FACFAS    Consider diagnostic injection to the tarsal tunnel region bilateral if her symptoms do not improve.    Referred to PT for a tunnel region/hindfoot and ankle bilateral  For astym and dry needling bilateral.    RTC 4-6 weeks or p.r.n. as discussed.

## 2021-01-05 PROBLEM — J30.9 ALLERGIC RHINITIS: Status: ACTIVE | Noted: 2019-07-26

## 2021-01-06 ENCOUNTER — HOSPITAL ENCOUNTER (OUTPATIENT)
Dept: RADIOLOGY | Facility: HOSPITAL | Age: 46
Discharge: HOME | End: 2021-01-06
Attending: FAMILY MEDICINE
Payer: COMMERCIAL

## 2021-01-06 DIAGNOSIS — R10.31 RIGHT LOWER QUADRANT ABDOMINAL PAIN: ICD-10-CM

## 2021-01-06 PROCEDURE — 76700 US EXAM ABDOM COMPLETE: CPT | Mod: 26,,, | Performed by: RADIOLOGY

## 2021-01-06 PROCEDURE — 76700 US ABDOMEN COMPLETE: ICD-10-PCS | Mod: 26,,, | Performed by: RADIOLOGY

## 2021-01-06 PROCEDURE — 76700 US EXAM ABDOM COMPLETE: CPT | Mod: TC

## 2024-03-15 ENCOUNTER — HOSPITAL ENCOUNTER (EMERGENCY)
Facility: HOSPITAL | Age: 49
Discharge: HOME | End: 2024-03-15
Attending: EMERGENCY MEDICINE
Payer: COMMERCIAL

## 2024-03-15 VITALS
WEIGHT: 188 LBS | OXYGEN SATURATION: 100 % | BODY MASS INDEX: 31.28 KG/M2 | RESPIRATION RATE: 11 BRPM | TEMPERATURE: 98 F | SYSTOLIC BLOOD PRESSURE: 158 MMHG | DIASTOLIC BLOOD PRESSURE: 85 MMHG | HEART RATE: 70 BPM

## 2024-03-15 DIAGNOSIS — R10.84 GENERALIZED ABDOMINAL PAIN: Primary | ICD-10-CM

## 2024-03-15 LAB
ALBUMIN SERPL BCP-MCNC: 3.9 G/DL (ref 3.5–5.2)
ALP SERPL-CCNC: 97 U/L (ref 55–135)
ALT SERPL W/O P-5'-P-CCNC: 30 U/L (ref 10–44)
ANION GAP SERPL CALC-SCNC: 11 MMOL/L (ref 8–16)
AST SERPL-CCNC: 24 U/L (ref 10–40)
BASOPHILS # BLD AUTO: 0.04 K/UL (ref 0–0.2)
BASOPHILS NFR BLD: 0.7 % (ref 0–1.9)
BILIRUB SERPL-MCNC: 0.6 MG/DL (ref 0.1–1)
BILIRUB UR QL STRIP: NEGATIVE
BUN SERPL-MCNC: 9 MG/DL (ref 6–20)
CALCIUM SERPL-MCNC: 9.2 MG/DL (ref 8.7–10.5)
CHLORIDE SERPL-SCNC: 103 MMOL/L (ref 95–110)
CLARITY UR: CLEAR
CO2 SERPL-SCNC: 25 MMOL/L (ref 23–29)
COLOR UR: YELLOW
CREAT SERPL-MCNC: 0.8 MG/DL (ref 0.5–1.4)
DIFFERENTIAL METHOD BLD: NORMAL
EOSINOPHIL # BLD AUTO: 0.2 K/UL (ref 0–0.5)
EOSINOPHIL NFR BLD: 3.2 % (ref 0–8)
ERYTHROCYTE [DISTWIDTH] IN BLOOD BY AUTOMATED COUNT: 11.8 % (ref 11.5–14.5)
EST. GFR  (NO RACE VARIABLE): >60 ML/MIN/1.73 M^2
GLUCOSE SERPL-MCNC: 91 MG/DL (ref 70–110)
GLUCOSE UR QL STRIP: NEGATIVE
HCT VFR BLD AUTO: 40.1 % (ref 37–48.5)
HGB BLD-MCNC: 13.4 G/DL (ref 12–16)
HGB UR QL STRIP: ABNORMAL
IMM GRANULOCYTES # BLD AUTO: 0.01 K/UL (ref 0–0.04)
IMM GRANULOCYTES NFR BLD AUTO: 0.2 % (ref 0–0.5)
KETONES UR QL STRIP: NEGATIVE
LEUKOCYTE ESTERASE UR QL STRIP: NEGATIVE
LIPASE SERPL-CCNC: 18 U/L (ref 4–60)
LYMPHOCYTES # BLD AUTO: 1.4 K/UL (ref 1–4.8)
LYMPHOCYTES NFR BLD: 24.6 % (ref 18–48)
MCH RBC QN AUTO: 29.8 PG (ref 27–31)
MCHC RBC AUTO-ENTMCNC: 33.4 G/DL (ref 32–36)
MCV RBC AUTO: 89 FL (ref 82–98)
MICROSCOPIC COMMENT: ABNORMAL
MONOCYTES # BLD AUTO: 0.4 K/UL (ref 0.3–1)
MONOCYTES NFR BLD: 7.4 % (ref 4–15)
NEUTROPHILS # BLD AUTO: 3.6 K/UL (ref 1.8–7.7)
NEUTROPHILS NFR BLD: 63.9 % (ref 38–73)
NITRITE UR QL STRIP: NEGATIVE
NRBC BLD-RTO: 0 /100 WBC
PH UR STRIP: 6 [PH] (ref 5–8)
PLATELET # BLD AUTO: 304 K/UL (ref 150–450)
PMV BLD AUTO: 10.2 FL (ref 9.2–12.9)
POTASSIUM SERPL-SCNC: 3.7 MMOL/L (ref 3.5–5.1)
PROT SERPL-MCNC: 7.4 G/DL (ref 6–8.4)
PROT UR QL STRIP: ABNORMAL
RBC # BLD AUTO: 4.5 M/UL (ref 4–5.4)
RBC #/AREA URNS HPF: 13 /HPF (ref 0–4)
SODIUM SERPL-SCNC: 139 MMOL/L (ref 136–145)
SP GR UR STRIP: 1.02 (ref 1–1.03)
SQUAMOUS #/AREA URNS HPF: 1 /HPF
URN SPEC COLLECT METH UR: ABNORMAL
UROBILINOGEN UR STRIP-ACNC: NEGATIVE EU/DL
WBC # BLD AUTO: 5.64 K/UL (ref 3.9–12.7)
WBC #/AREA URNS HPF: 2 /HPF (ref 0–5)

## 2024-03-15 PROCEDURE — 83690 ASSAY OF LIPASE: CPT | Performed by: NURSE PRACTITIONER

## 2024-03-15 PROCEDURE — 96374 THER/PROPH/DIAG INJ IV PUSH: CPT

## 2024-03-15 PROCEDURE — 80053 COMPREHEN METABOLIC PANEL: CPT | Performed by: NURSE PRACTITIONER

## 2024-03-15 PROCEDURE — 63600175 PHARM REV CODE 636 W HCPCS: Performed by: EMERGENCY MEDICINE

## 2024-03-15 PROCEDURE — 81000 URINALYSIS NONAUTO W/SCOPE: CPT | Performed by: NURSE PRACTITIONER

## 2024-03-15 PROCEDURE — 25000003 PHARM REV CODE 250: Performed by: NURSE PRACTITIONER

## 2024-03-15 PROCEDURE — 85025 COMPLETE CBC W/AUTO DIFF WBC: CPT | Performed by: NURSE PRACTITIONER

## 2024-03-15 PROCEDURE — 99285 EMERGENCY DEPT VISIT HI MDM: CPT | Mod: 25

## 2024-03-15 RX ORDER — PREDNISONE 20 MG/1
40 TABLET ORAL
COMMUNITY
Start: 2024-01-26 | End: 2024-03-15

## 2024-03-15 RX ORDER — BROMPHENIRAMINE MALEATE, PSEUDOEPHEDRINE HYDROCHLORIDE, AND DEXTROMETHORPHAN HYDROBROMIDE 2; 30; 10 MG/5ML; MG/5ML; MG/5ML
SYRUP ORAL
COMMUNITY
Start: 2024-01-26 | End: 2024-03-15

## 2024-03-15 RX ORDER — AMOXICILLIN 875 MG/1
875 TABLET, FILM COATED ORAL 2 TIMES DAILY
COMMUNITY
Start: 2024-01-26 | End: 2024-03-15

## 2024-03-15 RX ORDER — ONDANSETRON 4 MG/1
4 TABLET, ORALLY DISINTEGRATING ORAL EVERY 6 HOURS PRN
Qty: 10 TABLET | Refills: 0 | Status: SHIPPED | OUTPATIENT
Start: 2024-03-15

## 2024-03-15 RX ORDER — DICYCLOMINE HYDROCHLORIDE 20 MG/1
20 TABLET ORAL 3 TIMES DAILY PRN
Qty: 14 TABLET | Refills: 0 | Status: SHIPPED | OUTPATIENT
Start: 2024-03-15 | End: 2024-04-14

## 2024-03-15 RX ORDER — HYDROCODONE BITARTRATE AND ACETAMINOPHEN 5; 325 MG/1; MG/1
1 TABLET ORAL EVERY 6 HOURS PRN
COMMUNITY

## 2024-03-15 RX ORDER — LACTULOSE 10 G/15ML
10 SOLUTION ORAL 2 TIMES DAILY PRN
Qty: 150 ML | Refills: 0 | Status: SHIPPED | OUTPATIENT
Start: 2024-03-15

## 2024-03-15 RX ORDER — CODEINE PHOSPHATE AND GUAIFENESIN 10; 100 MG/5ML; MG/5ML
SOLUTION ORAL
COMMUNITY
Start: 2024-01-26 | End: 2024-03-15

## 2024-03-15 RX ORDER — ALUMINUM HYDROXIDE, MAGNESIUM HYDROXIDE, AND SIMETHICONE 1200; 120; 1200 MG/30ML; MG/30ML; MG/30ML
30 SUSPENSION ORAL ONCE
Status: COMPLETED | OUTPATIENT
Start: 2024-03-15 | End: 2024-03-15

## 2024-03-15 RX ORDER — KETOROLAC TROMETHAMINE 30 MG/ML
15 INJECTION, SOLUTION INTRAMUSCULAR; INTRAVENOUS
Status: COMPLETED | OUTPATIENT
Start: 2024-03-15 | End: 2024-03-15

## 2024-03-15 RX ORDER — LIDOCAINE HYDROCHLORIDE 20 MG/ML
15 SOLUTION OROPHARYNGEAL ONCE
Status: COMPLETED | OUTPATIENT
Start: 2024-03-15 | End: 2024-03-15

## 2024-03-15 RX ORDER — ASCORBIC ACID 500 MG
500 TABLET ORAL DAILY
COMMUNITY

## 2024-03-15 RX ORDER — CHOLECALCIFEROL (VITAMIN D3) 25 MCG
1000 TABLET ORAL DAILY
COMMUNITY

## 2024-03-15 RX ADMIN — ALUMINUM HYDROXIDE, MAGNESIUM HYDROXIDE, AND DIMETHICONE 30 ML: 200; 20; 200 SUSPENSION ORAL at 11:03

## 2024-03-15 RX ADMIN — KETOROLAC TROMETHAMINE 15 MG: 30 INJECTION, SOLUTION INTRAMUSCULAR; INTRAVENOUS at 12:03

## 2024-03-15 RX ADMIN — LIDOCAINE HYDROCHLORIDE 15 ML: 20 SOLUTION ORAL; TOPICAL at 11:03

## 2024-03-15 NOTE — PHARMACY MED REC
"Admission Medication History     The home medication history was taken by Anali Ruiz CPhT.    Medication history obtained from, Patient Verified    You may go to "Admission" then "Reconcile Home Medications" tabs to review and/or act upon these items.     The home medication list has been updated by the Pharmacy department.   Please read ALL comments highlighted in yellow.   Please address this information as you see fit.    Feel free to contact us if you have any questions or require assistance.      The medications listed below were removed from the home medication list.  Please reorder if appropriate:  Patient reports no longer taking the following medication(s):  Amoxicillin 875 mg  Bromfed DM 2-30-10/5ml syrup  Prednisone 20 mg  Turmeric Root 500 mg  Tussi-Organidin NR  mg/5ml syrup      Anali Ruiz CPhT.  Ext [phone]               .          "

## 2024-03-15 NOTE — ED PROVIDER NOTES
"Encounter Date: 3/15/2024       History     Chief Complaint   Patient presents with    Abdominal Pain     RUQ pain that began last night, pt had hx of gallbladder sx in , + nausea reported      48-year-old female presents to the emergency department complaining of abdominal pain.  Onset last night.  Describes generalized abdominal pain and low back pain as well as nausea.  States this feels similar to when she is to get "gallbladder attacks" but has had her gallbladder removed in .  Notes nausea without emesis.  No other symptoms reported.  Denies any fever, cough, congestion, chest pain shortness breath, constipation, diarrhea, dysuria, frequency, urgency.      Review of patient's allergies indicates:   Allergen Reactions    Bactrim [sulfamethoxazole-trimethoprim] Rash     Past Medical History:   Diagnosis Date    Hypertension      Past Surgical History:   Procedure Laterality Date    back injection      steroid injections x2     SECTION      CHOLECYSTECTOMY      tubal ligation       No family history on file.  Social History     Tobacco Use    Smoking status: Never    Smokeless tobacco: Never   Substance Use Topics    Alcohol use: No    Drug use: No     Review of Systems   Constitutional:  Negative for chills and fever.   HENT:  Negative for congestion.    Respiratory:  Negative for cough and shortness of breath.    Cardiovascular:  Negative for chest pain.   Gastrointestinal:  Positive for abdominal pain.   Musculoskeletal:  Positive for back pain.   Neurological:  Negative for light-headedness and headaches.       Physical Exam     Initial Vitals [03/15/24 1016]   BP Pulse Resp Temp SpO2   (!) 180/99 89 18 98.1 °F (36.7 °C) 96 %      MAP       --         Physical Exam    Nursing note and vitals reviewed.  Constitutional: She appears well-developed and well-nourished. No distress.   HENT:   Head: Normocephalic and atraumatic.   Eyes: Conjunctivae and EOM are normal. Pupils are equal, round, and " reactive to light.   Neck: Neck supple. No tracheal deviation present.   Normal range of motion.  Cardiovascular:  Normal rate and intact distal pulses.           Pulmonary/Chest: No respiratory distress.   Abdominal: Abdomen is soft. She exhibits no distension. There is abdominal tenderness (Minimal, generalized). There is no rebound and no guarding.   Musculoskeletal:         General: No tenderness. Normal range of motion.      Cervical back: Normal range of motion and neck supple.     Neurological: She is alert and oriented to person, place, and time. She has normal strength. No cranial nerve deficit. GCS score is 15. GCS eye subscore is 4. GCS verbal subscore is 5. GCS motor subscore is 6.   Skin: Skin is warm and dry.         ED Course   Procedures  Labs Reviewed   URINALYSIS, REFLEX TO URINE CULTURE - Abnormal; Notable for the following components:       Result Value    Protein, UA Trace (*)     Occult Blood UA 3+ (*)     All other components within normal limits    Narrative:     Specimen Source->Urine   URINALYSIS MICROSCOPIC - Abnormal; Notable for the following components:    RBC, UA 13 (*)     All other components within normal limits    Narrative:     Specimen Source->Urine   CBC W/ AUTO DIFFERENTIAL   COMPREHENSIVE METABOLIC PANEL   LIPASE              X-Rays:   Independently Interpreted Readings:   Other Readings:  CT abdomen and pelvis independently interpreted by me pending radiology review:  No acute obstruction or perforation appreciated    Imaging Results              CT Abdomen Pelvis  Without Contrast (Final result)  Result time 03/15/24 14:34:26      Final result by Scott Puente MD (03/15/24 14:34:26)                   Impression:      No obstructive uropathy.  No calcified nephroureterolithiasis.      Electronically signed by: Scott Puente MD  Date:    03/15/2024  Time:    14:34               Narrative:    EXAMINATION:  CT ABDOMEN PELVIS WITHOUT CONTRAST    CLINICAL HISTORY:  Flank  pain, kidney stone suspected;    TECHNIQUE:  Low dose axial images, sagittal and coronal reformations were obtained from the lung bases to the pubic symphysis, 30 mL of oral Omnipaque 350 was administered..    COMPARISON:  None    FINDINGS:  Heart: Normal in size. No pericardial effusion.    Lung Bases: Well aerated, without consolidation or pleural fluid.    Liver: Normal in size and attenuation, with no focal hepatic lesions.    Gallbladder: Cholecystectomy    Bile Ducts: No evidence of dilated ducts.    Pancreas: No mass or peripancreatic fat stranding.    Spleen: Unremarkable.    Adrenals: Unremarkable.    Kidneys/ Ureters: Unremarkable.    Bladder: No evidence of wall thickening.    Reproductive organs: Unremarkable.    GI Tract/Mesentery: No evidence of bowel obstruction or inflammation.  Appendix is visualized and appears unremarkable.    Peritoneal Space: No ascites. No free air.    Retroperitoneum: No significant adenopathy.    Abdominal wall: Unremarkable.    Vasculature: No significant atherosclerosis or aneurysm.    Bones: No acute fracture.                                      Medications   aluminum-magnesium hydroxide-simethicone 200-200-20 mg/5 mL suspension 30 mL (30 mLs Oral Given 3/15/24 1143)     And   LIDOcaine viscous HCl 2% oral solution 15 mL (15 mLs Oral Given 3/15/24 1143)   ketorolac injection 15 mg (15 mg Intravenous Given 3/15/24 1229)     Medical Decision Making  48-year-old female presents to the emergency department complaining of abdominal pain, back pain, nausea      Differential: Diverticulitis, cholecystitis, pancreatitis, appendicitis, obstruction, constipation UTI, pyelonephritis, kidney stone, gastroenteritis      Ordered a CT has patient had hematuria.  CT benign.  Patient given Toradol and a GI cocktail, tolerating p.o. and reports significant improvement in symptoms.  Vital signs stable.  Informed her of results as well as plan to discharge with prescriptions for Zofran,  Bentyl, lactulose, instructed on home management, over-the-counter medications, follow up with primary care physician, strict return precautions given.  Vital signs stable.    Problems Addressed:  Generalized abdominal pain: acute illness or injury    Amount and/or Complexity of Data Reviewed  External Data Reviewed: notes.     Details: Reviewed most recent PCP note documenting baseline medications and past medical history  Labs: ordered.     Details: CBC without leukocytosis, normal H&H; CMP with normal renal and liver function tests, normal electrolytes; UA without overt signs of infection  Radiology: ordered and independent interpretation performed. Decision-making details documented in ED Course.    Risk  OTC drugs.  Prescription drug management.  Parenteral controlled substances.                                      Clinical Impression:  Final diagnoses:  [R10.84] Generalized abdominal pain (Primary)          ED Disposition Condition    Discharge Stable          ED Prescriptions       Medication Sig Dispense Start Date End Date Auth. Provider    ondansetron (ZOFRAN-ODT) 4 MG TbDL Take 1 tablet (4 mg total) by mouth every 6 (six) hours as needed (Nausea). 10 tablet 3/15/2024 -- Kristian Finnegan MD    dicyclomine (BENTYL) 20 mg tablet Take 1 tablet (20 mg total) by mouth 3 (three) times daily as needed (for abdominal cramping). 14 tablet 3/15/2024 4/14/2024 Kristian Finnegan MD    lactulose (CHRONULAC) 20 gram/30 mL Soln Take 15 mLs (10 g total) by mouth 2 (two) times daily as needed (Constipation). 150 mL 3/15/2024 -- Kristian Finnegan MD          Follow-up Information       Follow up With Specialties Details Why Contact Info    Gil Pugh MD Family Medicine Schedule an appointment as soon as possible for a visit   [address] [facility]  [address]  [facility] [address] [phone]               Kristian Finnegan MD  03/15/24 0525

## 2024-03-15 NOTE — FIRST PROVIDER EVALUATION
Emergency Department TeleTriage Encounter Note      CHIEF COMPLAINT    Chief Complaint   Patient presents with    Abdominal Pain     RUQ pain that began last night, pt had hx of gallbladder sx in 2021, + nausea reported        VITAL SIGNS   Initial Vitals [03/15/24 1016]   BP Pulse Resp Temp SpO2   (!) 180/99 89 18 98.1 °F (36.7 °C) 96 %      MAP       --            ALLERGIES    Review of patient's allergies indicates:   Allergen Reactions    Bactrim [sulfamethoxazole-trimethoprim] Rash       PROVIDER TRIAGE NOTE  This is a teletriage evaluation of a 48 y.o. female presenting to the ED with c/o RUQ pain since last night.  Pt states she has nausea since last night.  Pt states she took a Vicodin last night with no relief.  Pt had her gallbladder removed in 2021 but pain is similar.  .     PE: VSS.  NAD noted.  Speaking in full sentences.      Plan: labs, urine, GI cocktail    Limited physical exam via telehealth: The patient is awake, alert, answering questions appropriately and is not in respiratory distress. All ED beds are full at present; patient notified of this status.  Patient seen and medically screened by Nurse Practitioner via teletriage.      Initial orders will be placed and care will be transferred to an alternate provider when patient is roomed for a full evaluation. Any additional orders and the final disposition will be determined by that provider.         ORDERS  Labs Reviewed   CBC W/ AUTO DIFFERENTIAL   COMPREHENSIVE METABOLIC PANEL   LIPASE   URINALYSIS, REFLEX TO URINE CULTURE       ED Orders (720h ago, onward)      Start Ordered     Status Ordering Provider    03/15/24 1130 03/15/24 1026  aluminum-magnesium hydroxide-simethicone 200-200-20 mg/5 mL suspension 30 mL  Once        See Hyperspace for full Linked Orders Report.    Ordered DAYTON FAROOQ    03/15/24 1130 03/15/24 1026  LIDOcaine viscous HCl 2% oral solution 15 mL  Once        See Hyperspace for full Linked Orders Report.    Ordered  DAYTON FAROOQ.    03/15/24 1027 03/15/24 1026  Vital signs  Every 2 hours         Ordered DAYTON FAROOQ.    03/15/24 1027 03/15/24 1026  Diet NPO  Diet effective now         Ordered DAYTON FAROOQ.    03/15/24 1027 03/15/24 1026  Insert peripheral IV  Once         Ordered DAYTON FAROOQ.    03/15/24 1027 03/15/24 1026  CBC W/ AUTO DIFFERENTIAL  STAT         Ordered OSEAS DAYTON RAZO.    03/15/24 1027 03/15/24 1026  Comp. Metabolic Panel  STAT         Ordered DAYTON FAROOQ.    03/15/24 1027 03/15/24 1026  Lipase  STAT         Ordered DAYTON FAROOQ.    03/15/24 1027 03/15/24 1026  Urinalysis, Reflex to Urine Culture Urine, Clean Catch  STAT         Ordered DAYTON FAROOQ JOSHUA              Virtual Visit Note: The provider triage portion of this emergency department evaluation and documentation was performed via [employer], a HIPAA-compliant telemedicine application, in concert with a tele-presenter in the room. A face to face patient evaluation with one of my colleagues will occur once the patient is placed in an emergency department room.      DISCLAIMER: This note was prepared with [employer] voice recognition transcription software. Garbled syntax, mangled pronouns, and other bizarre constructions may be attributed to that software system.

## 2024-03-15 NOTE — DISCHARGE INSTRUCTIONS
Please make sure you are drinking plenty of fluids.  I recommend taking ibuprofen 600 mg every 6 hours with food and/or Tylenol 650 mg every 6 hours in addition to the prescribed medication to help manage your pain.  Please call your primary care physician for follow-up appointment for further evaluation and management.  Please return with any new or worsening symptoms.

## 2024-04-04 ENCOUNTER — OFFICE VISIT (OUTPATIENT)
Dept: OBSTETRICS AND GYNECOLOGY | Facility: CLINIC | Age: 49
End: 2024-04-04
Payer: COMMERCIAL

## 2024-04-04 VITALS
DIASTOLIC BLOOD PRESSURE: 92 MMHG | WEIGHT: 190.56 LBS | BODY MASS INDEX: 31.72 KG/M2 | SYSTOLIC BLOOD PRESSURE: 158 MMHG

## 2024-04-04 DIAGNOSIS — Z01.419 WELL WOMAN EXAM WITH ROUTINE GYNECOLOGICAL EXAM: Primary | ICD-10-CM

## 2024-04-04 DIAGNOSIS — Z12.4 ROUTINE CERVICAL SMEAR: ICD-10-CM

## 2024-04-04 PROCEDURE — 87624 HPV HI-RISK TYP POOLED RSLT: CPT | Performed by: OBSTETRICS & GYNECOLOGY

## 2024-04-04 PROCEDURE — 99999 PR PBB SHADOW E&M-EST. PATIENT-LVL III: CPT | Mod: PBBFAC,,, | Performed by: OBSTETRICS & GYNECOLOGY

## 2024-04-04 PROCEDURE — 99386 PREV VISIT NEW AGE 40-64: CPT | Mod: S$GLB,,, | Performed by: OBSTETRICS & GYNECOLOGY

## 2024-04-04 PROCEDURE — 88175 CYTOPATH C/V AUTO FLUID REDO: CPT | Performed by: OBSTETRICS & GYNECOLOGY

## 2024-04-04 NOTE — PROGRESS NOTES
OBSTETRIC HISTORY:   OB History          2    Para   2    Term   2            AB        Living             SAB        IAB        Ectopic        Multiple        Live Births                      COMPREHENSIVE GYN HISTORY:  PAP History: Denies abnormal Paps.  Infection History: Denies STDs. Denies PID.  Benign History: Denies uterine fibroids. Denies ovarian cysts. Denies endometriosis.   Cancer History: Denies cervical cancer. Denies uterine cancer or hyperplasia. Denies ovarian cancer. Denies vulvar cancer or pre-cancer. Denies vaginal cancer or pre-cancer. Denies breast cancer. Denies colon cancer.  Sexual Activity History:   reports being sexually active. She reports using the following method of birth control/protection: See Surgical Hx.   Menstrual History: Had ablation  Dysmenorrhea History: Reports no dysmenorrhea.  Contraception: Tubal ligation    HPI:   48 y.o.  No LMP recorded. Patient has had an ablation.   Patient is  here for her annual gynecologic exam.  She has no GYN complaints. She denies bladder, breast and bowel complaints.    ROS:  GENERAL: No weight gain or weight loss.   CHEST: No shortness of breath.   ABDOMEN: No abdominal pain, constipation, diarrhea, nausea, vomiting or rectal bleeding.   URINARY: No frequency, dysuria, hematuria, or burning on urination.  REPRODUCTIVE: See HPI.   BREASTS: No breast pain, lumps, or nipple discharge.   PSYCHIATRIC: No depression or anxiety.    PE:   BP (!) 158/92   Wt 86.4 kg (190 lb 9.4 oz)   BMI 31.72 kg/m²   APPEARANCE: Well nourished, well developed, in no acute distress.  NECK: Neck symmetric without  thyromegaly.  NODES: No inguinal, cervical lymph node enlargement.  CHEST: Lungs clear to auscultation.  HEART: Regular rate and rhythm, no murmurs, rubs or gallops.  ABDOMEN: Soft. No tenderness or masses. No hernias.  BREASTS: Symmetrical, no skin changes or visible lesions. No palpable masses, nipple discharge or adenopathy  bilaterally.  PELVIC:   VULVA: No lesions. Normal female genitalia.  URETHRAL MEATUS: Normal size and location, no lesions, no prolapse.  URETHRA: No masses, tenderness, prolapse or scarring.  VAGINA: Moist and well rugated, no discharge, no significant cystocele or rectocele.  CERVIX: No lesions and discharge.  UTERUS: Normal size, regular shape, mobile, non-tender, bladder base nontender.  ADNEXA: No masses or tenderness.    PROCEDURES:  Pap smear  [facility]PV    Assessment:  Normal Gynecologic Exam    Plan:  Mammogram and Colonoscopy if indicated by current recommendations.  Return to clinic in one year or for any problems or complaints.    Counseling:  Patient was counseled today on A[place] Pap guidelines and recommendations for yearly pelvic exams and monthly self breast exams; to see her PCP for other health maintenance. Regular exercise and healthy diet.     As of April 1, 2021, the Cures Act has been passed nationally. This new law requires that all doctors progress notes, lab results, pathology reports and radiology reports be released IMMEDIATELY to the patient in the patient portal. That means that the results are released to you at the EXACT same time they are released to me. Therefore, with all of the patients that I have I am not able to reply to each patient exactly when the results come in. So there will be a delay from when you see the results to when I see them and have time to come up with a response to send you. Also I only see these results when I am on the computer at work. So if the results come in over the weekend or after 5 pm of a work day, I will not see them until the next business day. As you can tell, this is a challenge as a physician to give every patient the quick response they hope for and deserve. So please be patient!     Thanks for understanding,

## 2024-04-11 LAB
FINAL PATHOLOGIC DIAGNOSIS: NORMAL
HPV HR 12 DNA SPEC QL NAA+PROBE: NEGATIVE
HPV16 AG SPEC QL: NEGATIVE
HPV18 DNA SPEC QL NAA+PROBE: NEGATIVE
Lab: NORMAL

## 2024-07-09 ENCOUNTER — LAB VISIT (OUTPATIENT)
Dept: LAB | Facility: HOSPITAL | Age: 49
End: 2024-07-09
Attending: FAMILY MEDICINE
Payer: COMMERCIAL

## 2024-07-09 DIAGNOSIS — I10 PRIMARY HYPERTENSION: ICD-10-CM

## 2024-07-09 DIAGNOSIS — E66.09 CLASS 1 OBESITY DUE TO EXCESS CALORIES WITH SERIOUS COMORBIDITY AND BODY MASS INDEX (BMI) OF 31.0 TO 31.9 IN ADULT: ICD-10-CM

## 2024-07-09 LAB
ANION GAP SERPL CALC-SCNC: 7 MMOL/L (ref 8–16)
BUN SERPL-MCNC: 15 MG/DL (ref 6–20)
CALCIUM SERPL-MCNC: 9.1 MG/DL (ref 8.7–10.5)
CHLORIDE SERPL-SCNC: 107 MMOL/L (ref 95–110)
CHOLEST SERPL-MCNC: 171 MG/DL (ref 120–199)
CHOLEST/HDLC SERPL: 2.9 {RATIO} (ref 2–5)
CO2 SERPL-SCNC: 24 MMOL/L (ref 23–29)
CREAT SERPL-MCNC: 0.8 MG/DL (ref 0.5–1.4)
EST. GFR  (NO RACE VARIABLE): >60 ML/MIN/1.73 M^2
GLUCOSE SERPL-MCNC: 90 MG/DL (ref 70–110)
HDLC SERPL-MCNC: 58 MG/DL (ref 40–75)
HDLC SERPL: 33.9 % (ref 20–50)
LDLC SERPL CALC-MCNC: 100.2 MG/DL (ref 63–159)
NONHDLC SERPL-MCNC: 113 MG/DL
POTASSIUM SERPL-SCNC: 4.1 MMOL/L (ref 3.5–5.1)
SODIUM SERPL-SCNC: 138 MMOL/L (ref 136–145)
TRIGL SERPL-MCNC: 64 MG/DL (ref 30–150)
TSH SERPL DL<=0.005 MIU/L-ACNC: 0.64 UIU/ML (ref 0.4–4)

## 2024-07-09 PROCEDURE — 80061 LIPID PANEL: CPT | Performed by: FAMILY MEDICINE

## 2024-07-09 PROCEDURE — 80048 BASIC METABOLIC PNL TOTAL CA: CPT | Performed by: FAMILY MEDICINE

## 2024-07-09 PROCEDURE — 36415 COLL VENOUS BLD VENIPUNCTURE: CPT | Performed by: FAMILY MEDICINE

## 2024-07-09 PROCEDURE — 84443 ASSAY THYROID STIM HORMONE: CPT | Performed by: FAMILY MEDICINE

## (undated) DEVICE — PAD PREP 50/CA

## (undated) DEVICE — DRESSING TEGADERM 2 3/8 X 2.75

## (undated) DEVICE — UNDERGLOVE BIOGEL PI SZ 6.5 LF

## (undated) DEVICE — COVER OVERHEAD SURG LT BLUE

## (undated) DEVICE — ELECTRODE NEEDLE 1IN

## (undated) DEVICE — ELECTRODE REM PLYHSV RETURN 9

## (undated) DEVICE — CLOSURE SKIN STERI STRIP 1/2X4

## (undated) DEVICE — SUT VICRYL CTD 2-0 GI 27 SH

## (undated) DEVICE — SPONGE DERMA 8PLY 2X2

## (undated) DEVICE — SHEET THYROID W/ISO-BAC

## (undated) DEVICE — SUT VICRYL 3-0 27 SH

## (undated) DEVICE — GLOVE BIOGEL ECLIPSE SZ 6

## (undated) DEVICE — SEE MEDLINE ITEM 157117

## (undated) DEVICE — SEE MEDLINE ITEM 156955

## (undated) DEVICE — SKINMARKER W/RULER DEVON

## (undated) DEVICE — INSTRUMENT SUCTION FRAZIER 12F

## (undated) DEVICE — SEE MEDLINE ITEM 152622

## (undated) DEVICE — APPLICATOR CHLORAPREP ORN 26ML

## (undated) DEVICE — NDL HYPO REG 25G X 1 1/2

## (undated) DEVICE — TAPE ADH MEDIPORE 4 X 10YDS

## (undated) DEVICE — PACK BASIC

## (undated) DEVICE — MANIFOLD 4 PORT

## (undated) DEVICE — SEE MEDLINE ITEM 157116

## (undated) DEVICE — STRIP STERI REIN CLSR 1/2X2IN

## (undated) DEVICE — GAUZE SPONGE 4X4 12PLY

## (undated) DEVICE — SYR 10CC LUER LOCK

## (undated) DEVICE — SUT MCRYL PLUS 4-0 PS2 27IN

## (undated) DEVICE — TAPE MEDIPORE 4IN X 2YDS